# Patient Record
Sex: MALE | Race: WHITE | Employment: FULL TIME | URBAN - NONMETROPOLITAN AREA
[De-identification: names, ages, dates, MRNs, and addresses within clinical notes are randomized per-mention and may not be internally consistent; named-entity substitution may affect disease eponyms.]

---

## 2017-03-16 ENCOUNTER — TELEPHONE (OUTPATIENT)
Dept: CARDIOLOGY | Age: 27
End: 2017-03-16

## 2017-07-25 ENCOUNTER — HOSPITAL ENCOUNTER (EMERGENCY)
Age: 27
Discharge: HOME | End: 2017-07-25
Attending: EMERGENCY MEDICINE
Payer: COMMERCIAL

## 2017-07-25 VITALS
OXYGEN SATURATION: 100 % | HEART RATE: 88 BPM | SYSTOLIC BLOOD PRESSURE: 156 MMHG | DIASTOLIC BLOOD PRESSURE: 96 MMHG | TEMPERATURE: 98.4 F | RESPIRATION RATE: 18 BRPM | HEIGHT: 74 IN | BODY MASS INDEX: 25.67 KG/M2 | WEIGHT: 200 LBS

## 2017-07-25 DIAGNOSIS — T15.91XA FB EYE, RIGHT, INITIAL ENCOUNTER: Primary | ICD-10-CM

## 2017-07-25 PROCEDURE — 90715 TDAP VACCINE 7 YRS/> IM: CPT

## 2017-07-25 PROCEDURE — 90471 IMMUNIZATION ADMIN: CPT

## 2017-07-25 PROCEDURE — 65220 REMOVE FOREIGN BODY FROM EYE: CPT

## 2017-07-25 PROCEDURE — 99282 EMERGENCY DEPT VISIT SF MDM: CPT

## 2017-07-25 PROCEDURE — 6360000002 HC RX W HCPCS

## 2017-07-25 RX ORDER — PROPARACAINE HYDROCHLORIDE 5 MG/ML
SOLUTION/ DROPS OPHTHALMIC
Status: DISCONTINUED
Start: 2017-07-25 | End: 2017-07-25 | Stop reason: HOSPADM

## 2017-07-25 RX ORDER — SULFACETAMIDE SODIUM 100 MG/ML
2 SOLUTION/ DROPS OPHTHALMIC 4 TIMES DAILY
Qty: 1 BOTTLE | Refills: 0 | Status: SHIPPED | OUTPATIENT
Start: 2017-07-25 | End: 2017-07-30

## 2017-07-25 RX ADMIN — TETANUS TOXOID, REDUCED DIPHTHERIA TOXOID AND ACELLULAR PERTUSSIS VACCINE, ADSORBED 0.5 ML: 5; 2.5; 8; 8; 2.5 SUSPENSION INTRAMUSCULAR at 11:31

## 2017-07-25 ASSESSMENT — ENCOUNTER SYMPTOMS
PHOTOPHOBIA: 0
DIARRHEA: 0
SORE THROAT: 0
SHORTNESS OF BREATH: 0
EYE PAIN: 0
BLOOD IN STOOL: 0
NAUSEA: 0
COUGH: 0
WHEEZING: 0
ABDOMINAL PAIN: 0
BACK PAIN: 0
EYE ITCHING: 0
VOMITING: 0

## 2017-07-25 ASSESSMENT — VISUAL ACUITY
OS: 20/20
OU: 20/15
OD: 20/15

## 2018-09-14 ENCOUNTER — HOSPITAL ENCOUNTER (EMERGENCY)
Age: 28
Discharge: HOME | End: 2018-09-14
Attending: FAMILY MEDICINE
Payer: COMMERCIAL

## 2018-09-14 ENCOUNTER — APPOINTMENT (OUTPATIENT)
Dept: GENERAL RADIOLOGY | Age: 28
End: 2018-09-14
Payer: COMMERCIAL

## 2018-09-14 VITALS
TEMPERATURE: 98.8 F | RESPIRATION RATE: 18 BRPM | BODY MASS INDEX: 25.67 KG/M2 | OXYGEN SATURATION: 99 % | HEART RATE: 82 BPM | SYSTOLIC BLOOD PRESSURE: 140 MMHG | WEIGHT: 200 LBS | HEIGHT: 74 IN | DIASTOLIC BLOOD PRESSURE: 77 MMHG

## 2018-09-14 DIAGNOSIS — F41.1 ANXIETY STATE: Primary | ICD-10-CM

## 2018-09-14 DIAGNOSIS — R07.89 OTHER CHEST PAIN: ICD-10-CM

## 2018-09-14 LAB
ALBUMIN SERPL-MCNC: 4.8 G/DL (ref 3.5–5.1)
ALP BLD-CCNC: 67 U/L (ref 38–126)
ALT SERPL-CCNC: 22 U/L (ref 11–66)
AMPHETAMINE+METHAMPHETAMINE URINE SCREEN: NEGATIVE
ANION GAP SERPL CALCULATED.3IONS-SCNC: 15 MEQ/L (ref 8–16)
AST SERPL-CCNC: 21 U/L (ref 5–40)
BARBITURATE QUANTITATIVE URINE: NEGATIVE
BASOPHILS # BLD: 0.6 %
BASOPHILS ABSOLUTE: 0 THOU/MM3 (ref 0–0.1)
BENZODIAZEPINE QUANTITATIVE URINE: NEGATIVE
BILIRUB SERPL-MCNC: 0.4 MG/DL (ref 0.3–1.2)
BUN BLDV-MCNC: 13 MG/DL (ref 7–22)
C-REACTIVE PROTEIN: 0.25 MG/DL (ref 0–1)
CALCIUM SERPL-MCNC: 9.2 MG/DL (ref 8.5–10.5)
CANNABINOID QUANTITATIVE URINE: NEGATIVE
CHLORIDE BLD-SCNC: 99 MEQ/L (ref 98–111)
CO2: 26 MEQ/L (ref 23–33)
COCAINE METABOLITE QUANTITATIVE URINE: NEGATIVE
CREAT SERPL-MCNC: 1 MG/DL (ref 0.4–1.2)
EKG ATRIAL RATE: 80 BPM
EKG P AXIS: 63 DEGREES
EKG P-R INTERVAL: 138 MS
EKG Q-T INTERVAL: 384 MS
EKG QRS DURATION: 102 MS
EKG QTC CALCULATION (BAZETT): 442 MS
EKG R AXIS: 66 DEGREES
EKG T AXIS: 48 DEGREES
EKG VENTRICULAR RATE: 80 BPM
EOSINOPHIL # BLD: 0.5 %
EOSINOPHILS ABSOLUTE: 0 THOU/MM3 (ref 0–0.4)
ERYTHROCYTE [DISTWIDTH] IN BLOOD BY AUTOMATED COUNT: 12.5 % (ref 11.5–14.5)
ERYTHROCYTE [DISTWIDTH] IN BLOOD BY AUTOMATED COUNT: 40.3 FL (ref 35–45)
GFR SERPL CREATININE-BSD FRML MDRD: > 90 ML/MIN/1.73M2
GLUCOSE BLD-MCNC: 138 MG/DL (ref 70–108)
HCT VFR BLD CALC: 44.3 % (ref 42–52)
HEMOGLOBIN: 15.1 GM/DL (ref 14–18)
IMMATURE GRANS (ABS): 0.01 THOU/MM3 (ref 0–0.07)
IMMATURE GRANULOCYTES: 0.2 %
LYMPHOCYTES # BLD: 22.6 %
LYMPHOCYTES ABSOLUTE: 1.4 THOU/MM3 (ref 1–4.8)
MAGNESIUM: 2 MG/DL (ref 1.6–2.4)
MCH RBC QN AUTO: 30.1 PG (ref 26–33)
MCHC RBC AUTO-ENTMCNC: 34.1 GM/DL (ref 32.2–35.5)
MCV RBC AUTO: 88.2 FL (ref 80–94)
MONOCYTES # BLD: 7.5 %
MONOCYTES ABSOLUTE: 0.5 THOU/MM3 (ref 0.4–1.3)
NUCLEATED RED BLOOD CELLS: 0 /100 WBC
OPIATES, URINE: NEGATIVE
OSMOLALITY CALCULATION: 281.7 MOSMOL/KG (ref 275–300)
OXYCODONE: NEGATIVE
PHENCYCLIDINE QUANTITATIVE URINE: NEGATIVE
PLATELET # BLD: 295 THOU/MM3 (ref 130–400)
PMV BLD AUTO: 9.5 FL (ref 9.4–12.4)
POTASSIUM SERPL-SCNC: 3.8 MEQ/L (ref 3.5–5.2)
RBC # BLD: 5.02 MILL/MM3 (ref 4.7–6.1)
SEG NEUTROPHILS: 68.6 %
SEGMENTED NEUTROPHILS ABSOLUTE COUNT: 4.3 THOU/MM3 (ref 1.8–7.7)
SODIUM BLD-SCNC: 140 MEQ/L (ref 135–145)
TOTAL PROTEIN: 7.8 G/DL (ref 6.1–8)
TROPONIN T: < 0.01 NG/ML
WBC # BLD: 6.2 THOU/MM3 (ref 4.8–10.8)

## 2018-09-14 PROCEDURE — 84484 ASSAY OF TROPONIN QUANT: CPT

## 2018-09-14 PROCEDURE — 93010 ELECTROCARDIOGRAM REPORT: CPT | Performed by: INTERNAL MEDICINE

## 2018-09-14 PROCEDURE — 86140 C-REACTIVE PROTEIN: CPT

## 2018-09-14 PROCEDURE — 85025 COMPLETE CBC W/AUTO DIFF WBC: CPT

## 2018-09-14 PROCEDURE — 93005 ELECTROCARDIOGRAM TRACING: CPT | Performed by: FAMILY MEDICINE

## 2018-09-14 PROCEDURE — 83735 ASSAY OF MAGNESIUM: CPT

## 2018-09-14 PROCEDURE — 71046 X-RAY EXAM CHEST 2 VIEWS: CPT

## 2018-09-14 PROCEDURE — 80053 COMPREHEN METABOLIC PANEL: CPT

## 2018-09-14 PROCEDURE — 36415 COLL VENOUS BLD VENIPUNCTURE: CPT

## 2018-09-14 PROCEDURE — 99285 EMERGENCY DEPT VISIT HI MDM: CPT

## 2018-09-14 PROCEDURE — 80307 DRUG TEST PRSMV CHEM ANLYZR: CPT

## 2018-09-14 RX ORDER — HYDROXYZINE 50 MG/1
50 TABLET, FILM COATED ORAL 3 TIMES DAILY PRN
Qty: 21 TABLET | Refills: 0 | Status: SHIPPED | OUTPATIENT
Start: 2018-09-14 | End: 2018-10-10

## 2018-09-14 ASSESSMENT — ENCOUNTER SYMPTOMS
SORE THROAT: 0
EYE DISCHARGE: 0
DIARRHEA: 0
COUGH: 0
RHINORRHEA: 0
VOMITING: 0
NAUSEA: 0
SHORTNESS OF BREATH: 0
BACK PAIN: 0
EYE REDNESS: 0
WHEEZING: 0
ABDOMINAL PAIN: 0

## 2018-09-14 ASSESSMENT — HEART SCORE: ECG: 0

## 2018-09-14 NOTE — ED PROVIDER NOTES
Headache(784.0); and Migraine. SURGICAL HISTORY      has a past surgical history that includes Hand surgery (2007); shoulder surgery (2007); Tympanostomy tube placement (2010); and mastoidectomy (Right, 07/2013). CURRENT MEDICATIONS       Discharge Medication List as of 9/14/2018 12:32 PM          ALLERGIES     has No Known Allergies. FAMILY HISTORY     indicated that his mother is alive. He indicated that his father is alive. He indicated that both of his sisters are alive. He indicated that the status of his neg hx is unknown.    family history includes Thyroid Disease in his mother. SOCIAL HISTORY      reports that he quit smoking about 8 years ago. His smoking use included Cigarettes. He smoked 1.00 pack per day. His smokeless tobacco use includes Chew. He reports that he drinks alcohol. He reports that he does not use drugs. PHYSICAL EXAM     INITIAL VITALS:  height is 6' 2\" (1.88 m) and weight is 200 lb (90.7 kg). His oral temperature is 98.8 °F (37.1 °C). His blood pressure is 140/77 (abnormal) and his pulse is 82. His respiration is 18 and oxygen saturation is 99%. Physical Exam   Constitutional: He is oriented to person, place, and time. He appears well-developed and well-nourished. Non-toxic appearance. HENT:   Head: Normocephalic and atraumatic. Right Ear: Tympanic membrane and external ear normal.   Left Ear: Tympanic membrane and external ear normal.   Nose: Nose normal.   Mouth/Throat: Oropharynx is clear and moist and mucous membranes are normal. No oropharyngeal exudate, posterior oropharyngeal edema or posterior oropharyngeal erythema. Eyes: Conjunctivae and EOM are normal.   Neck: Normal range of motion. Neck supple. No JVD present. Cardiovascular: Normal rate, regular rhythm, normal heart sounds, intact distal pulses and normal pulses. Exam reveals no gallop and no friction rub. No murmur heard.   Pulses:       Radial pulses are 2+ on the right side, and 2+ on the left side. Dorsalis pedis pulses are 2+ on the right side, and 2+ on the left side. Posterior tibial pulses are 2+ on the right side, and 2+ on the left side. Pulmonary/Chest: Effort normal and breath sounds normal. No respiratory distress. He has no decreased breath sounds. He has no wheezes. He has no rhonchi. He has no rales. Abdominal: Soft. Bowel sounds are normal. He exhibits no distension. There is no tenderness. There is no rebound, no guarding and no CVA tenderness. Musculoskeletal: Normal range of motion. He exhibits no edema. Neurological: He is alert and oriented to person, place, and time. He exhibits normal muscle tone. Coordination normal.   Skin: Skin is warm and dry. No rash noted. He is not diaphoretic. Psychiatric: His mood appears anxious. Nursing note and vitals reviewed. DIFFERENTIAL DIAGNOSIS:   Including but not limited to: pericarditis, gerd, costochondritis, ACS, anxiety     DIAGNOSTIC RESULTS     EKG: All EKG's are interpreted by the Emergency Department Physician who either signs or Co-signs this chart in the absence of a cardiologist.  EKG interpreted by Amy Elizondo MD:  Vent. Rate: 80 bpm  LA interval: 138 ms  QRS duration: 102 ms  QTc: 442 ms  P-R-T axes: 63, 66, 48  Normal sinus rhythm   No STEMI  Compared to old EKG on 09-May-2016      RADIOLOGY: non-plain film images(s) such as CT, Ultrasound and MRI are read by the radiologist.    XR CHEST STANDARD (2 VW)   Final Result   Normal chest radiographs. **This report has been created using voice recognition software. It may contain minor errors which are inherent in voice recognition technology. **      Final report electronically signed by Dr. Juan Jose Valladares on 9/14/2018 11:49 AM          LABS:   Labs Reviewed   COMPREHENSIVE METABOLIC PANEL - Abnormal; Notable for the following:        Result Value    Glucose 138 (*)     All other components within normal limits   CBC WITH AUTO

## 2018-09-14 NOTE — CARE COORDINATION
Brief ED Social Work Assessment  9/14/18, 12:07 PM    Patient stated that he has insurance and requested and SRPS list.  Patient denied any needs.

## 2018-10-08 ENCOUNTER — TELEPHONE (OUTPATIENT)
Dept: CARDIOLOGY CLINIC | Age: 28
End: 2018-10-08

## 2018-10-08 ENCOUNTER — TELEPHONE (OUTPATIENT)
Dept: FAMILY MEDICINE CLINIC | Age: 28
End: 2018-10-08

## 2018-10-10 ENCOUNTER — OFFICE VISIT (OUTPATIENT)
Dept: FAMILY MEDICINE CLINIC | Age: 28
End: 2018-10-10
Payer: COMMERCIAL

## 2018-10-10 VITALS
HEART RATE: 84 BPM | HEIGHT: 72 IN | RESPIRATION RATE: 16 BRPM | WEIGHT: 215.6 LBS | DIASTOLIC BLOOD PRESSURE: 76 MMHG | SYSTOLIC BLOOD PRESSURE: 132 MMHG | BODY MASS INDEX: 29.2 KG/M2

## 2018-10-10 DIAGNOSIS — F41.9 ANXIETY: ICD-10-CM

## 2018-10-10 DIAGNOSIS — R00.2 HEART PALPITATIONS: Primary | ICD-10-CM

## 2018-10-10 PROCEDURE — G8484 FLU IMMUNIZE NO ADMIN: HCPCS | Performed by: FAMILY MEDICINE

## 2018-10-10 PROCEDURE — 1036F TOBACCO NON-USER: CPT | Performed by: FAMILY MEDICINE

## 2018-10-10 PROCEDURE — G8419 CALC BMI OUT NRM PARAM NOF/U: HCPCS | Performed by: FAMILY MEDICINE

## 2018-10-10 PROCEDURE — 99213 OFFICE O/P EST LOW 20 MIN: CPT | Performed by: FAMILY MEDICINE

## 2018-10-10 PROCEDURE — G8427 DOCREV CUR MEDS BY ELIG CLIN: HCPCS | Performed by: FAMILY MEDICINE

## 2018-10-10 RX ORDER — ESCITALOPRAM OXALATE 10 MG/1
10 TABLET ORAL DAILY
Qty: 30 TABLET | Refills: 1 | Status: SHIPPED | OUTPATIENT
Start: 2018-10-10 | End: 2018-11-07

## 2018-10-10 RX ORDER — HYDROXYZINE HYDROCHLORIDE 25 MG/1
25 TABLET, FILM COATED ORAL EVERY 8 HOURS PRN
Qty: 30 TABLET | Refills: 1 | Status: SHIPPED | OUTPATIENT
Start: 2018-10-10 | End: 2018-10-20

## 2018-10-10 ASSESSMENT — PATIENT HEALTH QUESTIONNAIRE - PHQ9
SUM OF ALL RESPONSES TO PHQ QUESTIONS 1-9: 0
SUM OF ALL RESPONSES TO PHQ QUESTIONS 1-9: 0
2. FEELING DOWN, DEPRESSED OR HOPELESS: 0
SUM OF ALL RESPONSES TO PHQ9 QUESTIONS 1 & 2: 0
1. LITTLE INTEREST OR PLEASURE IN DOING THINGS: 0

## 2018-10-10 ASSESSMENT — ENCOUNTER SYMPTOMS
GASTROINTESTINAL NEGATIVE: 1
RESPIRATORY NEGATIVE: 1

## 2018-10-10 NOTE — PROGRESS NOTES
and atraumatic. Right Ear: Tympanic membrane normal.   Left Ear: Tympanic membrane normal.   Mouth/Throat: Oropharynx is clear and moist and mucous membranes are normal.   Cardiovascular: Normal rate, regular rhythm and normal heart sounds. No murmur heard. Pulmonary/Chest: Effort normal and breath sounds normal.   Abdominal: Soft. Bowel sounds are normal.   Musculoskeletal: He exhibits no edema. Neurological: He is alert and oriented to person, place, and time. Skin: Skin is warm and dry. Psychiatric: He has a normal mood and affect. His behavior is normal.   Nursing note and vitals reviewed. Assessment:       Diagnosis Orders   1. Heart palpitations     2.  Anxiety  escitalopram (LEXAPRO) 10 MG tablet    hydrOXYzine (ATARAX) 25 MG tablet           Plan:      -  PMHx reviewed  -  Extensive cardiac work up in the past all reassuring, has follow up scheduled with Cardio later this month  -  Start Lexapro  -  rx Atarax prn  -  RTO 1 month        Merribeelizabeth Covert, DO

## 2018-10-25 ENCOUNTER — OFFICE VISIT (OUTPATIENT)
Dept: CARDIOLOGY CLINIC | Age: 28
End: 2018-10-25
Payer: COMMERCIAL

## 2018-10-25 VITALS
DIASTOLIC BLOOD PRESSURE: 98 MMHG | WEIGHT: 214.8 LBS | HEIGHT: 73 IN | SYSTOLIC BLOOD PRESSURE: 140 MMHG | HEART RATE: 87 BPM | BODY MASS INDEX: 28.47 KG/M2

## 2018-10-25 DIAGNOSIS — F41.9 ANXIETY: ICD-10-CM

## 2018-10-25 DIAGNOSIS — I10 HYPERTENSION, UNSPECIFIED TYPE: ICD-10-CM

## 2018-10-25 DIAGNOSIS — Z86.79 HISTORY OF PERICARDITIS: ICD-10-CM

## 2018-10-25 DIAGNOSIS — R00.2 PALPITATIONS: Primary | ICD-10-CM

## 2018-10-25 PROCEDURE — G8484 FLU IMMUNIZE NO ADMIN: HCPCS | Performed by: NURSE PRACTITIONER

## 2018-10-25 PROCEDURE — 1036F TOBACCO NON-USER: CPT | Performed by: NURSE PRACTITIONER

## 2018-10-25 PROCEDURE — G8419 CALC BMI OUT NRM PARAM NOF/U: HCPCS | Performed by: NURSE PRACTITIONER

## 2018-10-25 PROCEDURE — G8427 DOCREV CUR MEDS BY ELIG CLIN: HCPCS | Performed by: NURSE PRACTITIONER

## 2018-10-25 PROCEDURE — 99213 OFFICE O/P EST LOW 20 MIN: CPT | Performed by: NURSE PRACTITIONER

## 2018-10-25 RX ORDER — DILTIAZEM HYDROCHLORIDE 120 MG/1
120 CAPSULE, COATED, EXTENDED RELEASE ORAL DAILY
Qty: 30 CAPSULE | Refills: 3 | Status: SHIPPED | OUTPATIENT
Start: 2018-10-25 | End: 2018-11-07

## 2018-10-25 NOTE — PROGRESS NOTES
General Brady M.D.    Echo: 6/3/14  SUMMARY:    Left ventricle:  Size was normal.  Systolic function was normal. Ejection fraction was estimated in the range of  55 % to 65 %. There were no regional wall motion abnormalities. Summary: compared to prior study the ejection fraction is now better    Prepared and signed by    Efrem Denson DO    Stress test: 1/14/14  CONCLUSION:    1. Excellent treadmill exercise. 2.    Completed 12 metabolic equivalent functional Class 1.    3.    EKG was negative for ischemia. 4.    I do not see any contraindication to activities at this time but he          should not lift more than 50 lb. Efrem Denson D.O. Diagnosis Orders   1. Palpitations  ECHO Complete 2D W Doppler W Color   2. History of pericarditis     3. Hypertension, unspecified type     4. Anxiety         Orders Placed This Encounter   Procedures    ECHO Complete 2D W Doppler W Color     Standing Status:   Future     Standing Expiration Date:   10/25/2019     Order Specific Question:   Reason for exam:     Answer:   palpitations       Will repeat echo, last echo in 2014  Has tried metoprolol in past without any reduction in palpitations. Will start cardizem  mg daily for palpitations and to help lower BP. He will monitor his BP and HR at home and bring logged results to follow up appt. He is to call the office if his BP or HR are outside of the parameters discussed today. Advised to stop caffeine. Is on lexapro and is decreasing anxiety       Discussed use, benefit, and side effects of prescribed medications. All patient questions answered. Pt voiced understanding. Instructed to continue current medications, diet and exercise. Continue risk factor modification and medical management. Patient agreed with treatment plan. Follow up as directed.     Follow up as scheduled or sooner if needed

## 2018-11-07 ENCOUNTER — OFFICE VISIT (OUTPATIENT)
Dept: FAMILY MEDICINE CLINIC | Age: 28
End: 2018-11-07
Payer: COMMERCIAL

## 2018-11-07 ENCOUNTER — HOSPITAL ENCOUNTER (OUTPATIENT)
Dept: NON INVASIVE DIAGNOSTICS | Age: 28
Discharge: HOME | End: 2018-11-07
Payer: COMMERCIAL

## 2018-11-07 VITALS
BODY MASS INDEX: 28.36 KG/M2 | DIASTOLIC BLOOD PRESSURE: 72 MMHG | RESPIRATION RATE: 16 BRPM | SYSTOLIC BLOOD PRESSURE: 122 MMHG | HEIGHT: 73 IN | HEART RATE: 76 BPM | WEIGHT: 214 LBS

## 2018-11-07 DIAGNOSIS — F41.9 ANXIETY: ICD-10-CM

## 2018-11-07 DIAGNOSIS — R00.2 PALPITATIONS: ICD-10-CM

## 2018-11-07 DIAGNOSIS — R00.2 HEART PALPITATIONS: Primary | ICD-10-CM

## 2018-11-07 LAB
LV EF: 60 %
LVEF MODALITY: NORMAL

## 2018-11-07 PROCEDURE — 99213 OFFICE O/P EST LOW 20 MIN: CPT | Performed by: FAMILY MEDICINE

## 2018-11-07 PROCEDURE — 93306 TTE W/DOPPLER COMPLETE: CPT

## 2018-11-07 PROCEDURE — G8427 DOCREV CUR MEDS BY ELIG CLIN: HCPCS | Performed by: FAMILY MEDICINE

## 2018-11-07 PROCEDURE — G8419 CALC BMI OUT NRM PARAM NOF/U: HCPCS | Performed by: FAMILY MEDICINE

## 2018-11-07 PROCEDURE — 1036F TOBACCO NON-USER: CPT | Performed by: FAMILY MEDICINE

## 2018-11-07 PROCEDURE — G8484 FLU IMMUNIZE NO ADMIN: HCPCS | Performed by: FAMILY MEDICINE

## 2018-11-07 RX ORDER — HYDROXYZINE HYDROCHLORIDE 25 MG/1
25 TABLET, FILM COATED ORAL PRN
COMMUNITY
End: 2020-01-17 | Stop reason: SDUPTHER

## 2018-11-07 RX ORDER — ESCITALOPRAM OXALATE 20 MG/1
20 TABLET ORAL DAILY
Qty: 90 TABLET | Refills: 3 | Status: SHIPPED | OUTPATIENT
Start: 2018-11-07 | End: 2019-09-17 | Stop reason: SDUPTHER

## 2018-11-07 ASSESSMENT — ENCOUNTER SYMPTOMS
RESPIRATORY NEGATIVE: 1
GASTROINTESTINAL NEGATIVE: 1

## 2018-11-07 NOTE — PROGRESS NOTES
and well-nourished. HENT:   Head: Normocephalic and atraumatic. Right Ear: Tympanic membrane normal.   Left Ear: Tympanic membrane normal.   Mouth/Throat: Oropharynx is clear and moist and mucous membranes are normal.   Cardiovascular: Normal rate, regular rhythm and normal heart sounds. No murmur heard. Pulmonary/Chest: Effort normal and breath sounds normal.   Abdominal: Soft. Bowel sounds are normal.   Musculoskeletal: He exhibits no edema. Neurological: He is alert and oriented to person, place, and time. Skin: Skin is warm and dry. Psychiatric: He has a normal mood and affect. His behavior is normal.   Nursing note and vitals reviewed. Assessment:       Diagnosis Orders   1. Heart palpitations     2.  Anxiety  escitalopram (LEXAPRO) 20 MG tablet           Plan:      -  Overall doing a little better on the Lexapro, will increase the Lexapro  -  Continue Atarax prn  -  Follow up with Cardio, scheduled for echo  -  RTO as needed for now        Renata Llamas, DO

## 2018-12-06 ENCOUNTER — OFFICE VISIT (OUTPATIENT)
Dept: CARDIOLOGY CLINIC | Age: 28
End: 2018-12-06
Payer: COMMERCIAL

## 2018-12-06 VITALS
HEIGHT: 73 IN | BODY MASS INDEX: 29.72 KG/M2 | DIASTOLIC BLOOD PRESSURE: 88 MMHG | HEART RATE: 68 BPM | WEIGHT: 224.2 LBS | SYSTOLIC BLOOD PRESSURE: 132 MMHG

## 2018-12-06 DIAGNOSIS — I10 HYPERTENSION, UNSPECIFIED TYPE: ICD-10-CM

## 2018-12-06 DIAGNOSIS — Z86.79 HISTORY OF PERICARDITIS: ICD-10-CM

## 2018-12-06 DIAGNOSIS — F41.9 ANXIETY: ICD-10-CM

## 2018-12-06 DIAGNOSIS — R00.2 PALPITATIONS: Primary | ICD-10-CM

## 2018-12-06 PROCEDURE — G8427 DOCREV CUR MEDS BY ELIG CLIN: HCPCS | Performed by: NURSE PRACTITIONER

## 2018-12-06 PROCEDURE — G8419 CALC BMI OUT NRM PARAM NOF/U: HCPCS | Performed by: NURSE PRACTITIONER

## 2018-12-06 PROCEDURE — 99213 OFFICE O/P EST LOW 20 MIN: CPT | Performed by: NURSE PRACTITIONER

## 2018-12-06 PROCEDURE — G8484 FLU IMMUNIZE NO ADMIN: HCPCS | Performed by: NURSE PRACTITIONER

## 2018-12-06 PROCEDURE — 1036F TOBACCO NON-USER: CPT | Performed by: NURSE PRACTITIONER

## 2019-06-26 ENCOUNTER — TELEPHONE (OUTPATIENT)
Dept: CARDIOLOGY CLINIC | Age: 29
End: 2019-06-26

## 2019-09-17 DIAGNOSIS — F41.9 ANXIETY: ICD-10-CM

## 2019-09-17 RX ORDER — ESCITALOPRAM OXALATE 20 MG/1
TABLET ORAL
Qty: 90 TABLET | Refills: 0 | Status: SHIPPED | OUTPATIENT
Start: 2019-09-17 | End: 2020-01-17 | Stop reason: SDUPTHER

## 2020-01-17 ENCOUNTER — OFFICE VISIT (OUTPATIENT)
Dept: FAMILY MEDICINE CLINIC | Age: 30
End: 2020-01-17
Payer: COMMERCIAL

## 2020-01-17 VITALS
HEIGHT: 73 IN | WEIGHT: 252.5 LBS | BODY MASS INDEX: 33.46 KG/M2 | DIASTOLIC BLOOD PRESSURE: 66 MMHG | SYSTOLIC BLOOD PRESSURE: 112 MMHG | RESPIRATION RATE: 18 BRPM | HEART RATE: 72 BPM

## 2020-01-17 PROCEDURE — G8484 FLU IMMUNIZE NO ADMIN: HCPCS | Performed by: FAMILY MEDICINE

## 2020-01-17 PROCEDURE — 99395 PREV VISIT EST AGE 18-39: CPT | Performed by: FAMILY MEDICINE

## 2020-01-17 RX ORDER — ESCITALOPRAM OXALATE 20 MG/1
TABLET ORAL
Qty: 30 TABLET | Refills: 11 | Status: SHIPPED | OUTPATIENT
Start: 2020-01-17 | End: 2021-02-16 | Stop reason: SDUPTHER

## 2020-01-17 RX ORDER — HYDROXYZINE HYDROCHLORIDE 25 MG/1
25 TABLET, FILM COATED ORAL EVERY 8 HOURS PRN
Qty: 40 TABLET | Refills: 2 | Status: SHIPPED | OUTPATIENT
Start: 2020-01-17

## 2020-01-17 SDOH — ECONOMIC STABILITY: INCOME INSECURITY: HOW HARD IS IT FOR YOU TO PAY FOR THE VERY BASICS LIKE FOOD, HOUSING, MEDICAL CARE, AND HEATING?: NOT HARD AT ALL

## 2020-01-17 SDOH — ECONOMIC STABILITY: FOOD INSECURITY: WITHIN THE PAST 12 MONTHS, YOU WORRIED THAT YOUR FOOD WOULD RUN OUT BEFORE YOU GOT MONEY TO BUY MORE.: NEVER TRUE

## 2020-01-17 SDOH — ECONOMIC STABILITY: TRANSPORTATION INSECURITY
IN THE PAST 12 MONTHS, HAS THE LACK OF TRANSPORTATION KEPT YOU FROM MEDICAL APPOINTMENTS OR FROM GETTING MEDICATIONS?: NO

## 2020-01-17 SDOH — ECONOMIC STABILITY: FOOD INSECURITY: WITHIN THE PAST 12 MONTHS, THE FOOD YOU BOUGHT JUST DIDN'T LAST AND YOU DIDN'T HAVE MONEY TO GET MORE.: NEVER TRUE

## 2020-01-17 SDOH — ECONOMIC STABILITY: TRANSPORTATION INSECURITY
IN THE PAST 12 MONTHS, HAS LACK OF TRANSPORTATION KEPT YOU FROM MEETINGS, WORK, OR FROM GETTING THINGS NEEDED FOR DAILY LIVING?: NO

## 2020-01-17 ASSESSMENT — ENCOUNTER SYMPTOMS
GASTROINTESTINAL NEGATIVE: 1
RESPIRATORY NEGATIVE: 1

## 2020-01-17 ASSESSMENT — PATIENT HEALTH QUESTIONNAIRE - PHQ9
SUM OF ALL RESPONSES TO PHQ QUESTIONS 1-9: 0
SUM OF ALL RESPONSES TO PHQ9 QUESTIONS 1 & 2: 0
1. LITTLE INTEREST OR PLEASURE IN DOING THINGS: 0
SUM OF ALL RESPONSES TO PHQ QUESTIONS 1-9: 0
2. FEELING DOWN, DEPRESSED OR HOPELESS: 0

## 2021-02-16 ENCOUNTER — VIRTUAL VISIT (OUTPATIENT)
Dept: FAMILY MEDICINE CLINIC | Age: 31
End: 2021-02-16
Payer: COMMERCIAL

## 2021-02-16 DIAGNOSIS — F41.9 ANXIETY: ICD-10-CM

## 2021-02-16 DIAGNOSIS — Z00.00 WELL ADULT HEALTH CHECK: Primary | ICD-10-CM

## 2021-02-16 DIAGNOSIS — N52.9 ERECTILE DYSFUNCTION, UNSPECIFIED ERECTILE DYSFUNCTION TYPE: ICD-10-CM

## 2021-02-16 PROCEDURE — G8484 FLU IMMUNIZE NO ADMIN: HCPCS | Performed by: FAMILY MEDICINE

## 2021-02-16 PROCEDURE — 99395 PREV VISIT EST AGE 18-39: CPT | Performed by: FAMILY MEDICINE

## 2021-02-16 RX ORDER — ESCITALOPRAM OXALATE 20 MG/1
TABLET ORAL
Qty: 90 TABLET | Refills: 3 | Status: SHIPPED | OUTPATIENT
Start: 2021-02-16 | End: 2022-03-17

## 2021-02-16 ASSESSMENT — ENCOUNTER SYMPTOMS
GASTROINTESTINAL NEGATIVE: 1
RESPIRATORY NEGATIVE: 1

## 2021-02-16 NOTE — PROGRESS NOTES
iSsi Leong (:  1990) is a 31 y.o. male,Established patient, here for evaluation of the following chief complaint(s): Annual Exam        SUBJECTIVE/OBJECTIVE:  HPI:    Chief Complaint   Patient presents with    Annual Exam     Annual wellness. Doing well overall. He is still on the Lexapro, working well. Rarely needs the Atarax. He does admit to some ED issues at times, questions whether it's related to the Lexapro. Patient Active Problem List   Diagnosis    EMIL (serous otitis media)    Dysfunction of eustachian tube    Cholesteatoma, middle ear    Conductive hearing loss in right ear    Dizzy    Acute myopericarditis     Past Surgical History:   Procedure Laterality Date    HAND SURGERY  2007    MASTOIDECTOMY Right 2013    SHOULDER SURGERY      TYMPANOSTOMY TUBE PLACEMENT      dr. Edy Lynn    VASECTOMY           Review of Systems   Constitutional: Negative. HENT: Negative. Respiratory: Negative. Cardiovascular: Negative. Gastrointestinal: Negative. Genitourinary:        ED   Musculoskeletal: Negative. All other systems reviewed and are negative. No flowsheet data found. Physical Exam  Constitutional:       General: He is not in acute distress. Appearance: Normal appearance. He is well-developed. He is not ill-appearing. HENT:      Head: Normocephalic and atraumatic. Right Ear: External ear normal.      Left Ear: External ear normal.   Eyes:      Conjunctiva/sclera: Conjunctivae normal.   Pulmonary:      Effort: Pulmonary effort is normal. No respiratory distress. Skin:     Findings: No rash (on exposed surfaces). Neurological:      Mental Status: He is alert and oriented to person, place, and time. Psychiatric:         Mood and Affect: Mood normal.         Behavior: Behavior normal.         Thought Content: Thought content normal.         Judgment: Judgment normal.       ASSESSMENT/PLAN:  1. Well adult health check  2.  Anxiety -     escitalopram (LEXAPRO) 20 MG tablet; TAKE 1 TABLET BY MOUTH EVERY DAY, Disp-90 tablet, R-3Normal  3. Erectile dysfunction, unspecified erectile dysfunction type    -  Healthy lifestyle discussed  -  Doing well on the Lexapro, refill sent  -  Options for #3 discussed, declines at this time  -  May be related to the Lexapro but he wishes to continue at current dose    Return in about 1 year (around 2/16/2022) for Wellness. Mahesh Ivy is a 31 y.o. male being evaluated by a Virtual Visit (video visit) encounter to address concerns as mentioned above. A caregiver was present when appropriate. Due to this being a TeleHealth encounter (During ZNYWM-36 public health emergency), evaluation of the following organ systems was limited: Vitals/Constitutional/EENT/Resp/CV/GI//MS/Neuro/Skin/Heme-Lymph-Imm. Pursuant to the emergency declaration under the [address] [place] [address] authority and the [employer] Act, this Virtual Visit was conducted with patient's (and/or legal guardian's) consent, to reduce the patient's risk of exposure to COVID-19 and provide necessary medical care. The patient (and/or legal guardian) has also been advised to contact this office for worsening conditions or problems, and seek emergency medical treatment and/or call 911 if deemed necessary. Patient identification was verified at the start of the visit: Yes    Services were provided through a video synchronous discussion virtually to substitute for in-person clinic visit. Patient was located at home and provider was located in office or at home. An electronic signature was used to authenticate this note.     --Darren Maguire, DO

## 2021-09-14 ENCOUNTER — HOSPITAL ENCOUNTER (EMERGENCY)
Age: 31
Discharge: HOME | End: 2021-09-14
Attending: EMERGENCY MEDICINE
Payer: MEDICARE

## 2021-09-14 ENCOUNTER — APPOINTMENT (OUTPATIENT)
Dept: GENERAL RADIOLOGY | Age: 31
End: 2021-09-14
Payer: MEDICARE

## 2021-09-14 VITALS
TEMPERATURE: 98.7 F | RESPIRATION RATE: 18 BRPM | HEIGHT: 73 IN | DIASTOLIC BLOOD PRESSURE: 98 MMHG | SYSTOLIC BLOOD PRESSURE: 143 MMHG | OXYGEN SATURATION: 97 % | WEIGHT: 250 LBS | BODY MASS INDEX: 33.13 KG/M2 | HEART RATE: 87 BPM

## 2021-09-14 DIAGNOSIS — U07.1 COVID-19: Primary | ICD-10-CM

## 2021-09-14 LAB
ANION GAP SERPL CALCULATED.3IONS-SCNC: 12 MEQ/L (ref 8–16)
BASOPHILS # BLD: 0.2 %
BASOPHILS ABSOLUTE: 0 THOU/MM3 (ref 0–0.1)
BUN BLDV-MCNC: 14 MG/DL (ref 7–22)
CALCIUM SERPL-MCNC: 9 MG/DL (ref 8.5–10.5)
CHLORIDE BLD-SCNC: 100 MEQ/L (ref 98–111)
CO2: 27 MEQ/L (ref 23–33)
CREAT SERPL-MCNC: 0.9 MG/DL (ref 0.4–1.2)
EKG ATRIAL RATE: 90 BPM
EKG P AXIS: 55 DEGREES
EKG P-R INTERVAL: 150 MS
EKG Q-T INTERVAL: 354 MS
EKG QRS DURATION: 94 MS
EKG QTC CALCULATION (BAZETT): 433 MS
EKG R AXIS: 17 DEGREES
EKG T AXIS: 59 DEGREES
EKG VENTRICULAR RATE: 90 BPM
EOSINOPHIL # BLD: 0 %
EOSINOPHILS ABSOLUTE: 0 THOU/MM3 (ref 0–0.4)
ERYTHROCYTE [DISTWIDTH] IN BLOOD BY AUTOMATED COUNT: 12.6 % (ref 11.5–14.5)
ERYTHROCYTE [DISTWIDTH] IN BLOOD BY AUTOMATED COUNT: 41.1 FL (ref 35–45)
GFR SERPL CREATININE-BSD FRML MDRD: > 90 ML/MIN/1.73M2
GLUCOSE BLD-MCNC: 101 MG/DL (ref 70–108)
HCT VFR BLD CALC: 47.6 % (ref 42–52)
HEMOGLOBIN: 16.3 GM/DL (ref 14–18)
IMMATURE GRANS (ABS): 0.05 THOU/MM3 (ref 0–0.07)
IMMATURE GRANULOCYTES: 0.6 %
LYMPHOCYTES # BLD: 17.8 %
LYMPHOCYTES ABSOLUTE: 1.5 THOU/MM3 (ref 1–4.8)
MCH RBC QN AUTO: 30.6 PG (ref 26–33)
MCHC RBC AUTO-ENTMCNC: 34.2 GM/DL (ref 32.2–35.5)
MCV RBC AUTO: 89.5 FL (ref 80–94)
MONOCYTES # BLD: 13.7 %
MONOCYTES ABSOLUTE: 1.2 THOU/MM3 (ref 0.4–1.3)
NUCLEATED RED BLOOD CELLS: 0 /100 WBC
PLATELET # BLD: 241 THOU/MM3 (ref 130–400)
PMV BLD AUTO: 9.2 FL (ref 9.4–12.4)
POTASSIUM SERPL-SCNC: 3.9 MEQ/L (ref 3.5–5.2)
RBC # BLD: 5.32 MILL/MM3 (ref 4.7–6.1)
SEG NEUTROPHILS: 67.7 %
SEGMENTED NEUTROPHILS ABSOLUTE COUNT: 5.8 THOU/MM3 (ref 1.8–7.7)
SODIUM BLD-SCNC: 139 MEQ/L (ref 135–145)
TROPONIN T: < 0.01 NG/ML
WBC # BLD: 8.6 THOU/MM3 (ref 4.8–10.8)

## 2021-09-14 PROCEDURE — 71045 X-RAY EXAM CHEST 1 VIEW: CPT

## 2021-09-14 PROCEDURE — 85025 COMPLETE CBC W/AUTO DIFF WBC: CPT

## 2021-09-14 PROCEDURE — 93005 ELECTROCARDIOGRAM TRACING: CPT | Performed by: EMERGENCY MEDICINE

## 2021-09-14 PROCEDURE — 36415 COLL VENOUS BLD VENIPUNCTURE: CPT

## 2021-09-14 PROCEDURE — 99282 EMERGENCY DEPT VISIT SF MDM: CPT

## 2021-09-14 PROCEDURE — 80048 BASIC METABOLIC PNL TOTAL CA: CPT

## 2021-09-14 PROCEDURE — 84484 ASSAY OF TROPONIN QUANT: CPT

## 2021-09-14 ASSESSMENT — ENCOUNTER SYMPTOMS
PHOTOPHOBIA: 0
SHORTNESS OF BREATH: 1
ABDOMINAL PAIN: 0
COUGH: 0
CONSTIPATION: 0
ABDOMINAL DISTENTION: 0
DIARRHEA: 0
CHEST TIGHTNESS: 0
VOMITING: 0
SINUS PAIN: 0
NAUSEA: 0
SINUS PRESSURE: 0

## 2021-09-14 ASSESSMENT — PAIN DESCRIPTION - LOCATION: LOCATION: CHEST

## 2021-09-14 ASSESSMENT — PAIN SCALES - GENERAL: PAINLEVEL_OUTOF10: 2

## 2021-09-14 ASSESSMENT — PAIN DESCRIPTION - PAIN TYPE: TYPE: ACUTE PAIN

## 2021-09-14 NOTE — ED PROVIDER NOTES
[address] [facility] [address] [address] EMERGENCY DEPT  Faculty Attestation    I performed a history and physical examination of the patient and discussed management with the resident. I reviewed the residents note and agree with the documented findings and plan of care. Any areas of disagreement are noted on the chart. I was personally present for the key portions of any procedures. I have documented in the chart those procedures where I was not present during the key portions. I have reviewed the emergency nurses triage note. I agree with the chief complaint, past medical history, past surgical history, allergies, medications, social, and family history as documented unless otherwise noted below. This is a 62-year-old male who presents to the emergency department for repeat evaluation  Patient was seen in urgent care yesterday and was diagnosed with COVID pneumonia  Patient tested positive for COVID 2 weeks ago  For the last several days he has had some shortness of breath and intermittent low retrosternal chest pressure  He does not have active chest pain today  Patient was placed on azithromycin and prednisone yesterday  As he is not better today he presents to the ER to make sure \"nothing else\" is going on  No fever today  No wheezing  No acute GI/ symptoms  No lower extremity edema or calf pain  No known history of PE/DVT  Review of systems otherwise negative  He has no additional concerns at this time    On examination he appears in no acute distress  Heart is regular in rate and rhythm  Lungs are clear to auscultation  Abdomen soft and nontender  Chest wall is nontender  No lower extremity edema  No calf pain  Negative Jillian sign  Normal peripheral perfusion and skin turgor  Normal vital signs    EKG 1131 shows a sinus rhythm with a rate of 90 bpm.  Intervals within normal limits. Normal axis. Poor R wave progression. No T wave inversion.   No ST segment depression/elevation or evidence for acute ischemia/infarction.     Patient is PERC negative  No suspicion for pulmonary embolism  D-dimer or CT not indicated    Labs reviewed  Normal WBC count  Trop negative    X-ray showing lingular infiltrate    Patient updated  Admission not indicated  Stable for outpatient follow up      Jasen Hauser DO  Attending Emergency Physician        Latisha Vivar DO  09/14/21 [id number]

## 2021-09-14 NOTE — ED TRIAGE NOTES
Pt presents to the ED for chest pain and dizziness that started today. Pt states that yesterday he was diagnosed with COVID pneumonia and started on steroids and an antibiotic. Pt states he has been sick since 9/3 and yesterday was his last day of quarantine.  Pt states he felt he was having a fever earlier today so took 500mg tylenol around 1030

## 2021-09-14 NOTE — ED PROVIDER NOTES
[facility] ENCOUNTER          Pt Name: Gena Mcintosh  MRN: [id number]  [place] 1990  Date of evaluation: 9/14/2021  Treating Resident Physician: Margarette Victoria MD  Supervising Physician: Dr. Tahira Thakkar       Chief Complaint   Patient presents with    Chest Pain    Post-COVID Symptoms     History obtained from the patient. HISTORY OF PRESENT ILLNESS    HPI  Gena Mcintosh is a 31 y.o. male who presents to the emergency department for evaluation of shortness of breath and lightheadedness    Patient states that he was diagnosed with Covid 2 weeks ago. At the end of his quarantine he was still feeling short of breath so he came into the emergency room to be evaluated yesterday. Patient yesterday he was having some substernal chest pain that has since resolved as well as severe shortness of breath. Patient said that he was diagnosed with Covid pneumonia and discharged on a steroid and antibiotic. Today the patient says that he is not having any chest pain however he still remains short of breath feels like he cannot catch his breath. Patient has he does have a productive cough however he does not spit it out he just swallows it so is unsure what it looks like. Patient says he is also having some lightheadedness when he is up and moving. Patient says he has been taking his medications as directed. Patient denies any tobacco alcohol or recreational drug use    The patient has no other acute complaints at this time. REVIEW OF SYSTEMS   Review of Systems   Constitutional: Negative for activity change, chills, fatigue and fever. HENT: Negative for sinus pressure and sinus pain. Eyes: Negative for photophobia and visual disturbance. Respiratory: Positive for shortness of breath. Negative for cough and chest tightness. Cardiovascular: Negative for chest pain and leg swelling.    Gastrointestinal: Negative for abdominal distention, abdominal pain, constipation, diarrhea, nausea and vomiting. Genitourinary: Negative for dysuria and hematuria. Neurological: Positive for light-headedness. Negative for dizziness, numbness and headaches. PAST MEDICAL AND SURGICAL HISTORY     Past Medical History:   Diagnosis Date    Acute myopericarditis     Anxiety     Cholesteatoma     Headache(784.0)     Migraine      Past Surgical History:   Procedure Laterality Date    HAND SURGERY  2007    MASTOIDECTOMY Right 2013    SHOULDER SURGERY      TYMPANOSTOMY TUBE PLACEMENT      dr. Adalid Lo    VASECTOMY           MEDICATIONS   No current facility-administered medications for this encounter. Current Outpatient Medications:     escitalopram (LEXAPRO) 20 MG tablet, TAKE 1 TABLET BY MOUTH EVERY DAY, Disp: 90 tablet, Rfl: 3    hydrOXYzine (ATARAX) 25 MG tablet, Take 1 tablet by mouth every 8 hours as needed for Anxiety, Disp: 40 tablet, Rfl: 2      SOCIAL HISTORY     Social History     Social History Narrative    ** Merged History Encounter **          Social History     Tobacco Use    Smoking status: Former Smoker     Packs/day: 1.00     Years: 0.50     Pack years: 0.50     Types: Cigarettes     Quit date: 2010     Years since quittin.0    Smokeless tobacco: Former User     Types: Chew     Quit date: 2018   Vaping Use    Vaping Use: Never used   Substance Use Topics    Alcohol use: Yes     Comment: occas.  Drug use: No         ALLERGIES   No Known Allergies      FAMILY HISTORY     Family History   Problem Relation Age of Onset    Thyroid Disease Mother     Cancer Neg Hx     Stroke Neg Hx     Birth Defects Neg Hx     Diabetes Neg Hx          PREVIOUS RECORDS   Previous records reviewed: Previously seen in the emergency department on 2018 for anxiety. Patient was worked up and ultimately deemed suitable for discharge home with follow-up. .        PHYSICAL EXAM     ED Triage Vitals   BP Temp Temp src Pulse Resp SpO2 Height Weight   -- -- -- -- -- -- -- --     Initial vital signs and nursing assessment reviewed and normal. Body mass index is 32.98 kg/m². Pulsoximetry is normal per my interpretation. Additional Vital Signs:  Vitals:    09/14/21 1135   BP: (!) 143/98   Pulse: 87   Resp: 18   Temp: 98.7 °F (37.1 °C)   SpO2: 97%       Physical Exam  Constitutional:       Appearance: He is normal weight. He is not ill-appearing. HENT:      Head: Normocephalic and atraumatic. Mouth/Throat:      Mouth: Mucous membranes are moist.      Pharynx: Oropharynx is clear. Eyes:      General: No scleral icterus. Right eye: No discharge. Left eye: No discharge. Pulmonary:      Effort: Pulmonary effort is normal.      Breath sounds: Normal breath sounds. Abdominal:      General: Abdomen is flat. Bowel sounds are normal.      Palpations: Abdomen is soft. Musculoskeletal:         General: No swelling or deformity. Normal range of motion. Cervical back: Normal range of motion. Skin:     General: Skin is warm and dry. Coloration: Skin is not jaundiced. Neurological:      General: No focal deficit present. Mental Status: He is alert and oriented to person, place, and time. Psychiatric:         Mood and Affect: Mood normal.         Behavior: Behavior normal.             MEDICAL DECISION MAKING   Initial Assessment:   1. Previously diagnosed pneumonia Covid versus other: She was diagnosed with Covid pneumonia yesterday. Patient says he is not improved. Patient is contagious shortness of breath lightheadedness. Patient is afebrile. Is not tachycardic or tachypneic.     Plan:    EKG   Repeat chest x-ray   CBC   BMP          ED RESULTS   Laboratory results:  Labs Reviewed   CBC WITH AUTO DIFFERENTIAL - Abnormal; Notable for the following components:       Result Value    MPV 9.2 (*)     All other components within normal limits   BASIC METABOLIC PANEL   TROPONIN   ANION GAP GLOMERULAR FILTRATION RATE, ESTIMATED       Radiologic studies results:  XR CHEST PORTABLE   Final Result   1. Lingular infiltrate. .               **This report has been created using voice recognition software. It may contain minor errors which are inherent in voice recognition technology. **      Final report electronically signed by DR Andres Fortune on 9/14/2021 12:13 PM          ED Medications administered this visit: Medications - No data to display      ED COURSE     ED Course as of Sep 14 1315   Tue Sep 14, 2021   1151 Patient perks out EKG has no signs of right heart strain. Patient is not tachycardic and not tachypneic satting well on room air. Clinical picture does not fit someone with a pulmonary embolism. D-dimer and CTA unnecessary pulmonary embolism is unlikely. [CRIS]   9911 Patient classified as mild Covid with a prescore of 1. [CRIS]   8893 Patient CBC unremarkable   CBC auto differential(!):    WBC 8.6   RBC 5.32   Hemoglobin Quant 16.3   Hematocrit 47.6   MCV 89.5   MCH 30.6   MCHC 34.2   RDW-CV 12.6   RDW-SD 41.1   Platelet Count 623   MPV 9.2(!)   Seg Neutrophils 67.7   Lymphocytes 17.8   Monocytes 13.7   Eosinophils 0.0   Basophils 0.2   Immature Granulocytes 0.6   Segs Absolute 5.8   Lymphocytes Absolute 1.5   Monocytes Absolute 1.2   Eosinophils Absolute 0.0   Basophils Absolute 0.0   Immature Grans (Abs) 0.05   Nucleated Red Blood Cells 0 [CRIS]   1209 Patient is not a candidate for Regeneron    [CRIS]   1315 Results and treatment plan were discussed with the patient and patient is in agreement. Patient was instructed to return if she starts experiencing worsening abdominal pain especially pain that is diffuse across the entire abdomen, pain accompanied with nausea vomiting, any lightheadedness dizziness or loss of consciousness, pain accompanied with shortness of breath as these can be signs of serious medical condition.     [CRIS]      ED Course User Index  [CRIS] Aldair Solis MD       Strict return precautions and follow up instructions were discussed with the patient prior to discharge, with which the patient agrees. MEDICATION CHANGES     Discharge Medication List as of 9/14/2021 12:25 PM            FINAL DISPOSITION     Final diagnoses:   COVID-19     Condition: condition: stable  Dispo: Discharge to home      This transcription was electronically signed. Parts of this transcriptions may have been dictated by use of voice recognition software and electronically transcribed, and parts may have been transcribed with the assistance of an ED scribe. The transcription may contain errors not detected in proofreading. Please refer to my supervising physician's documentation if my documentation differs.     Electronically Signed: Beryle Look, MD, 09/14/21, 1:15 PM         Noe Maldonado MD  Resident  09/14/21 [id number]

## 2021-09-15 ENCOUNTER — TELEPHONE (OUTPATIENT)
Dept: FAMILY MEDICINE CLINIC | Age: 31
End: 2021-09-15

## 2021-09-15 ENCOUNTER — CARE COORDINATION (OUTPATIENT)
Dept: CARE COORDINATION | Age: 31
End: 2021-09-15

## 2021-09-15 NOTE — LETTER
Tyler VALLES AM OFFESTELA CISNEROS.MEENAKSHI, [address]  Phone: [phone]  Fax: [phone]     September 15, 2021    [address] [place] [address] [facility] [address]    Dear Melissa Wilcox,    Thank you for choosing our [facility] on 9/14/21. Your Provider wanted to make sure that you understand your discharge instructions and that you were able to fill any prescriptions that may have been ordered for you. Please contact the office at the above phone number if you were advised to follow up with your Provider, or if you have any further questions or needs. Also did you know -                            [facility] practices can often offer you an appointment on the same day that you call for acute issues. *We have some [facility] offices that offer Walk-in appointments; check our website for availability in your community, [email or web address].      *Evisits are now available for patients through [address] [facility] also offers video visits through [address] If you do not have MyChart and are interested, please contact the office and a staff member may assist you or go to [email or web address].       Sincerely,     Abdoulaye Gipson DO and your [facility] [place]

## 2021-10-18 ENCOUNTER — NURSE ONLY (OUTPATIENT)
Dept: LAB | Age: 31
End: 2021-10-18

## 2021-10-18 ENCOUNTER — NURSE TRIAGE (OUTPATIENT)
Dept: OTHER | Facility: CLINIC | Age: 31
End: 2021-10-18

## 2021-10-18 ENCOUNTER — OFFICE VISIT (OUTPATIENT)
Dept: FAMILY MEDICINE CLINIC | Age: 31
End: 2021-10-18
Payer: MEDICARE

## 2021-10-18 VITALS
DIASTOLIC BLOOD PRESSURE: 74 MMHG | SYSTOLIC BLOOD PRESSURE: 126 MMHG | RESPIRATION RATE: 18 BRPM | WEIGHT: 236.1 LBS | BODY MASS INDEX: 31.15 KG/M2 | TEMPERATURE: 98.1 F | HEART RATE: 88 BPM

## 2021-10-18 DIAGNOSIS — R19.5 LOOSE STOOLS: ICD-10-CM

## 2021-10-18 DIAGNOSIS — F41.9 ANXIETY: ICD-10-CM

## 2021-10-18 DIAGNOSIS — H65.93 MIDDLE EAR EFFUSION, BILATERAL: ICD-10-CM

## 2021-10-18 DIAGNOSIS — R10.13 EPIGASTRIC ABDOMINAL PAIN: ICD-10-CM

## 2021-10-18 DIAGNOSIS — R42 DIZZINESS: ICD-10-CM

## 2021-10-18 DIAGNOSIS — H71.91 CHOLESTEATOMA OF RIGHT MIDDLE EAR: ICD-10-CM

## 2021-10-18 DIAGNOSIS — R10.13 EPIGASTRIC ABDOMINAL PAIN: Primary | ICD-10-CM

## 2021-10-18 LAB
ALBUMIN SERPL-MCNC: 5.1 G/DL (ref 3.5–5.1)
ALP BLD-CCNC: 78 U/L (ref 38–126)
ALT SERPL-CCNC: 23 U/L (ref 11–66)
AMYLASE: 35 U/L (ref 20–104)
ANION GAP SERPL CALCULATED.3IONS-SCNC: 14 MEQ/L (ref 8–16)
AST SERPL-CCNC: 16 U/L (ref 5–40)
BASOPHILS # BLD: 0.6 %
BASOPHILS ABSOLUTE: 0.1 THOU/MM3 (ref 0–0.1)
BILIRUB SERPL-MCNC: 0.5 MG/DL (ref 0.3–1.2)
BUN BLDV-MCNC: 14 MG/DL (ref 7–22)
CALCIUM SERPL-MCNC: 10 MG/DL (ref 8.5–10.5)
CHLORIDE BLD-SCNC: 99 MEQ/L (ref 98–111)
CO2: 26 MEQ/L (ref 23–33)
CREAT SERPL-MCNC: 0.9 MG/DL (ref 0.4–1.2)
EOSINOPHIL # BLD: 0.1 %
EOSINOPHILS ABSOLUTE: 0 THOU/MM3 (ref 0–0.4)
ERYTHROCYTE [DISTWIDTH] IN BLOOD BY AUTOMATED COUNT: 12.8 % (ref 11.5–14.5)
ERYTHROCYTE [DISTWIDTH] IN BLOOD BY AUTOMATED COUNT: 43.8 FL (ref 35–45)
GFR SERPL CREATININE-BSD FRML MDRD: > 90 ML/MIN/1.73M2
GLUCOSE BLD-MCNC: 115 MG/DL (ref 70–108)
HCT VFR BLD CALC: 52.1 % (ref 42–52)
HEMOGLOBIN: 16.8 GM/DL (ref 14–18)
IMMATURE GRANS (ABS): 0.05 THOU/MM3 (ref 0–0.07)
IMMATURE GRANULOCYTES: 0.4 %
LIPASE: 18.6 U/L (ref 5.6–51.3)
LYMPHOCYTES # BLD: 8.8 %
LYMPHOCYTES ABSOLUTE: 1 THOU/MM3 (ref 1–4.8)
MCH RBC QN AUTO: 30 PG (ref 26–33)
MCHC RBC AUTO-ENTMCNC: 32.2 GM/DL (ref 32.2–35.5)
MCV RBC AUTO: 93 FL (ref 80–94)
MONOCYTES # BLD: 4.5 %
MONOCYTES ABSOLUTE: 0.5 THOU/MM3 (ref 0.4–1.3)
NUCLEATED RED BLOOD CELLS: 0 /100 WBC
PLATELET # BLD: 383 THOU/MM3 (ref 130–400)
PMV BLD AUTO: 9.5 FL (ref 9.4–12.4)
POTASSIUM SERPL-SCNC: 4.1 MEQ/L (ref 3.5–5.2)
RBC # BLD: 5.6 MILL/MM3 (ref 4.7–6.1)
SEG NEUTROPHILS: 85.6 %
SEGMENTED NEUTROPHILS ABSOLUTE COUNT: 9.8 THOU/MM3 (ref 1.8–7.7)
SODIUM BLD-SCNC: 139 MEQ/L (ref 135–145)
TOTAL PROTEIN: 8 G/DL (ref 6.1–8)
TSH SERPL DL<=0.05 MIU/L-ACNC: 1.39 UIU/ML (ref 0.4–4.2)
WBC # BLD: 11.4 THOU/MM3 (ref 4.8–10.8)

## 2021-10-18 PROCEDURE — G8417 CALC BMI ABV UP PARAM F/U: HCPCS | Performed by: FAMILY MEDICINE

## 2021-10-18 PROCEDURE — 1036F TOBACCO NON-USER: CPT | Performed by: FAMILY MEDICINE

## 2021-10-18 PROCEDURE — G8484 FLU IMMUNIZE NO ADMIN: HCPCS | Performed by: FAMILY MEDICINE

## 2021-10-18 PROCEDURE — G8427 DOCREV CUR MEDS BY ELIG CLIN: HCPCS | Performed by: FAMILY MEDICINE

## 2021-10-18 PROCEDURE — 99214 OFFICE O/P EST MOD 30 MIN: CPT | Performed by: FAMILY MEDICINE

## 2021-10-18 RX ORDER — PANTOPRAZOLE SODIUM 40 MG/1
40 TABLET, DELAYED RELEASE ORAL
Qty: 30 TABLET | Refills: 0 | Status: SHIPPED | OUTPATIENT
Start: 2021-10-18 | End: 2021-11-09

## 2021-10-18 RX ORDER — BUSPIRONE HYDROCHLORIDE 7.5 MG/1
7.5 TABLET ORAL 2 TIMES DAILY
Qty: 60 TABLET | Refills: 0 | Status: SHIPPED | OUTPATIENT
Start: 2021-10-18 | End: 2021-11-09

## 2021-10-18 RX ORDER — ALBUTEROL SULFATE 90 UG/1
2 AEROSOL, METERED RESPIRATORY (INHALATION) EVERY 4 HOURS PRN
COMMUNITY
Start: 2021-09-13

## 2021-10-18 SDOH — ECONOMIC STABILITY: FOOD INSECURITY: WITHIN THE PAST 12 MONTHS, YOU WORRIED THAT YOUR FOOD WOULD RUN OUT BEFORE YOU GOT MONEY TO BUY MORE.: NEVER TRUE

## 2021-10-18 SDOH — ECONOMIC STABILITY: FOOD INSECURITY: WITHIN THE PAST 12 MONTHS, THE FOOD YOU BOUGHT JUST DIDN'T LAST AND YOU DIDN'T HAVE MONEY TO GET MORE.: NEVER TRUE

## 2021-10-18 ASSESSMENT — PATIENT HEALTH QUESTIONNAIRE - PHQ9
SUM OF ALL RESPONSES TO PHQ QUESTIONS 1-9: 0
SUM OF ALL RESPONSES TO PHQ QUESTIONS 1-9: 0
2. FEELING DOWN, DEPRESSED OR HOPELESS: 0
SUM OF ALL RESPONSES TO PHQ QUESTIONS 1-9: 0
1. LITTLE INTEREST OR PLEASURE IN DOING THINGS: 0
SUM OF ALL RESPONSES TO PHQ9 QUESTIONS 1 & 2: 0

## 2021-10-18 ASSESSMENT — ENCOUNTER SYMPTOMS
CONSTIPATION: 0
BLOOD IN STOOL: 0
ABDOMINAL PAIN: 1
NAUSEA: 0
RESPIRATORY NEGATIVE: 1
VOMITING: 0
DIARRHEA: 1

## 2021-10-18 ASSESSMENT — SOCIAL DETERMINANTS OF HEALTH (SDOH): HOW HARD IS IT FOR YOU TO PAY FOR THE VERY BASICS LIKE FOOD, HOUSING, MEDICAL CARE, AND HEATING?: NOT HARD AT ALL

## 2021-10-18 NOTE — PROGRESS NOTES
Justin Duran (:  1990) is a 31 y.o. male,Established patient, here for evaluation of the following chief complaint(s):  Abdominal Pain (upper mid started a week and a half ago), Diarrhea, Dizziness, Anxiety, Otalgia (bilateral), and Fatigue        Subjective   SUBJECTIVE/OBJECTIVE:  HPI:    Chief Complaint   Patient presents with    Abdominal Pain     upper mid started a week and a half ago    Diarrhea    Dizziness    Anxiety    Otalgia     bilateral    Fatigue     Pt presents with multiple complaints today. Pt c/o dizziness and ear pain that began yesterday. Some sinus pressure. Hx of cholesteatoma on the right. Stomach pain, loose stools for the last 10 days. No fevers. Was on Keflex 1-2 weeks ago. This was given for an infection that he had on his leg as well as an abx for pneumonia post-COVID. Will have formed stools at times. Down 14 lbs from September. Wt Readings from Last 3 Encounters:   10/18/21 236 lb 1.6 oz (107.1 kg)   21 250 lb (113.4 kg)   20 252 lb 8 oz (114.5 kg)     Anxiety not well controlled. On Lexapro and prn Atarax. Only takes 1/2 tab Atarax due to drowsiness.       Patient Active Problem List   Diagnosis    EMIL (serous otitis media)    Dysfunction of eustachian tube    Cholesteatoma, middle ear    Conductive hearing loss in right ear    Dizzy    Acute myopericarditis     Past Surgical History:   Procedure Laterality Date    HAND SURGERY  2007    MASTOIDECTOMY Right 2013    SHOULDER SURGERY  2007    TYMPANOSTOMY TUBE PLACEMENT      dr. Beltran Glass    VASECTOMY       Social History     Tobacco Use    Smoking status: Former Smoker     Packs/day: 1.00     Years: 0.50     Pack years: 0.50     Types: Cigarettes     Quit date: 2010     Years since quittin.1    Smokeless tobacco: Former User     Types: [address] date: 2018   Vaping Use    Vaping Use: Never used   Substance Use Topics    Alcohol use: Yes     Comment: occasPegge Mark Drug use: No       Review of Systems   Constitutional: Positive for appetite change and unexpected weight change. Negative for fever. HENT: Positive for congestion and ear pain. Negative for ear discharge. Respiratory: Negative. Cardiovascular: Negative. Gastrointestinal: Positive for abdominal pain and diarrhea. Negative for blood in stool, constipation, nausea and vomiting. Musculoskeletal: Negative. Neurological: Positive for dizziness. Negative for headaches. All other systems reviewed and are negative. Objective   Physical Exam  Vitals and nursing note reviewed. Constitutional:       General: He is not in acute distress. Appearance: Normal appearance. He is well-developed. HENT:      Head: Normocephalic and atraumatic. Right Ear: A middle ear effusion is present. Left Ear: A middle ear effusion is present. Eyes:      Conjunctiva/sclera: Conjunctivae normal.   Cardiovascular:      Rate and Rhythm: Normal rate and regular rhythm. Heart sounds: Normal heart sounds. No murmur heard. Pulmonary:      Effort: Pulmonary effort is normal.      Breath sounds: Normal breath sounds. No wheezing, rhonchi or rales. Abdominal:      General: There is no distension. Tenderness: There is abdominal tenderness in the epigastric area. There is no right CVA tenderness, left CVA tenderness, guarding or rebound. Negative signs include Goodwin's sign and McBurney's sign. Musculoskeletal:      Cervical back: Neck supple. Skin:     General: Skin is warm and dry. Findings: No rash (on exposed surfaces). Neurological:      General: No focal deficit present. Mental Status: He is alert. Psychiatric:         Attention and Perception: Attention normal.         Mood and Affect: Mood normal.         Speech: Speech normal.         Behavior: Behavior normal. Behavior is cooperative. Thought Content:  Thought content normal.         Judgment: Judgment

## 2021-10-18 NOTE — TELEPHONE ENCOUNTER
(4-7): interferes with normal activities or awakens from sleep, tender to touch      - SEVERE (8-10): excruciating pain, doubled over, unable to do any normal activities        5/10 pain     7. RECURRENT SYMPTOM: \"Have you ever had this type of abdominal pain before? \" If so, ask: \"When was the last time? \" and \"What happened that time? \"   had abd pain in the past, never had medical diagnosis         8. CAUSE: \"What do you think is causing the abdominal pain? \"      When eating. 9. RELIEVING/AGGRAVATING FACTORS: \"What makes it better or worse? \" (e.g., movement, antacids, bowel movement)      Eat/ drink makes worse    10. OTHER SYMPTOMS: \"Has there been any vomiting, diarrhea, constipation, or urine problems? \"        Diarrhea - started about same time as stomach pain    Protocols used: ABDOMINAL PAIN - MALE-ADULT-

## 2021-10-20 ENCOUNTER — OFFICE VISIT (OUTPATIENT)
Dept: CARDIOLOGY CLINIC | Age: 31
End: 2021-10-20
Payer: MEDICARE

## 2021-10-20 VITALS
SYSTOLIC BLOOD PRESSURE: 144 MMHG | HEIGHT: 73 IN | WEIGHT: 242.6 LBS | HEART RATE: 71 BPM | DIASTOLIC BLOOD PRESSURE: 87 MMHG | BODY MASS INDEX: 32.15 KG/M2

## 2021-10-20 DIAGNOSIS — R06.09 DOE (DYSPNEA ON EXERTION): Primary | ICD-10-CM

## 2021-10-20 DIAGNOSIS — R00.2 PALPITATION: ICD-10-CM

## 2021-10-20 DIAGNOSIS — I20.8 ANGINA OF EFFORT (HCC): ICD-10-CM

## 2021-10-20 PROCEDURE — G8484 FLU IMMUNIZE NO ADMIN: HCPCS | Performed by: INTERNAL MEDICINE

## 2021-10-20 PROCEDURE — G8427 DOCREV CUR MEDS BY ELIG CLIN: HCPCS | Performed by: INTERNAL MEDICINE

## 2021-10-20 PROCEDURE — G8417 CALC BMI ABV UP PARAM F/U: HCPCS | Performed by: INTERNAL MEDICINE

## 2021-10-20 PROCEDURE — 99204 OFFICE O/P NEW MOD 45 MIN: CPT | Performed by: INTERNAL MEDICINE

## 2021-10-20 NOTE — PROGRESS NOTES
[address] [facility] [place] [place] [address] [place] [address]  Dept: [phone]  Dept Fax: [phone]  Loc: [phone]    Visit Date: 10/20/2021    Mr. Olinda Mehta is a 31 y.o. male  who presented for:    New patient  Chest pain, Palpitations     HPI:   SACHI Fischer is a pleasant 31year old male patient who  has a past medical history of Acute myopericarditis, Anxiety, Cholesteatoma, Headache(784.0), and Migraine. He has family history of MI/CAD in his grandfather. He had COVID PNA last month, was treated at home (steroids) and was recently treated for cellulitis with Abx. He reports prior history pericarditis in 2014. The patient was referred for recurrent palpitations, chest pain. He states that chest pain is retrosternal, non-radiating, described as tightness, non-exertional, mild. He has worsening dyspnea on exertion, gets tired easily. Reports shortness of breath. Has occasional dizziness. Patient denies orthopnea, paroxysmal nocturnal dyspnea, syncope, weight gain or leg swelling. Current Outpatient Medications:     albuterol sulfate  (90 Base) MCG/ACT inhaler, Inhale 2 puffs into the lungs every 4 hours as needed, Disp: , Rfl:     busPIRone (BUSPAR) 7.5 MG tablet, Take 1 tablet by mouth 2 times daily, Disp: 60 tablet, Rfl: 0    pantoprazole (PROTONIX) 40 MG tablet, Take 1 tablet by mouth daily (with breakfast), Disp: 30 tablet, Rfl: 0    escitalopram (LEXAPRO) 20 MG tablet, TAKE 1 TABLET BY MOUTH EVERY DAY, Disp: 90 tablet, Rfl: 3    hydrOXYzine (ATARAX) 25 MG tablet, Take 1 tablet by mouth every 8 hours as needed for Anxiety, Disp: 40 tablet, Rfl: 2    Past Medical History  Mary Lou Almanzar  has a past medical history of Acute myopericarditis, Anxiety, Cholesteatoma, Headache(784.0), and Migraine. Social History  Mary Lou Almanzar  reports that he quit smoking about 11 years ago. His smoking use included cigarettes.  He has a 0.50 pack-year smoking history. He quit smokeless tobacco use about 3 years ago. His smokeless tobacco use included chew. He reports current alcohol use. He reports that he does not use drugs. Family History  Bismark Abbott family history includes Thyroid Disease in his mother. Past Surgical History   Past Surgical History:   Procedure Laterality Date    HAND SURGERY  2007    MASTOIDECTOMY Right 07/2013   [place] SHOULDER SURGERY  2007    TYMPANOSTOMY TUBE PLACEMENT  2010    dr. Jeanie Herron    VASECTOMY         Review of Systems   Constitutional: Negative for chills and fever  HENT: Negative for congestion, sinus pressure, sneezing and sore throat. Eyes: Negative for pain, discharge, redness and itching. Respiratory: Negative for apnea, cough  Gastrointestinal: Negative for blood in stool, constipation, diarrhea   Endocrine: Negative for cold intolerance, heat intolerance, polydipsia. Genitourinary: Negative for dysuria, enuresis, flank pain and hematuria. Musculoskeletal: Negative for arthralgias, joint swelling and neck pain. Neurological: Negative for numbness and headaches. Psychiatric/Behavioral: Negative for agitation, confusion, decreased concentration and dysphoric mood. Objective:     BP (!) 144/87   Pulse 71   Ht 6' 1\" (1.854 m)   Wt 242 lb 9.6 oz (110 kg)   BMI 32.01 kg/m²     Wt Readings from Last 3 Encounters:   10/20/21 242 lb 9.6 oz (110 kg)   10/18/21 236 lb 1.6 oz (107.1 kg)   09/14/21 250 lb (113.4 kg)     BP Readings from Last 3 Encounters:   10/20/21 (!) 144/87   10/18/21 126/74   09/14/21 (!) 143/98       Nursing note and vitals reviewed. Physical Exam   Constitutional: Oriented to person, place, and time. Appears well-developed and well-nourished. ENT: Moist mucous membranes. No bleeding. Tongue is midline. Head: Normocephalic and atraumatic. Eyes: EOM are normal. Pupils are equal, round, and reactive to light. Neck: Normal range of motion. Neck supple. No JVD present. Cardiovascular: Normal rate, regular rhythm, no murmur, no rubs, and intact distal pulses. Pulmonary/Chest: Effort normal and breath sounds normal. No respiratory distress. No wheezes. No rales. Abdominal: Soft. Bowel sounds are normal. No distension. There is no tenderness. Musculoskeletal: Normal range of motion. no edema. Neurological: Alert and oriented to person, place, and time. No cranial nerve deficit. Coordination normal.   Skin: Skin is warm and dry. Psychiatric: Normal mood and affect.        Lab Results   Component Value Date    CKTOTAL 253 01/24/2014    CKTOTAL 138 01/16/2014    CKTOTAL 119 01/10/2014    CKMB 24.5 01/08/2014       Lab Results   Component Value Date    WBC 11.4 10/18/2021    RBC 5.60 10/18/2021    HGB 16.8 10/18/2021    HCT 52.1 10/18/2021    MCV 93.0 10/18/2021    MCH 30.0 10/18/2021    MCHC 32.2 10/18/2021    RDW 13.1 05/09/2016     10/18/2021    MPV 9.5 10/18/2021       Lab Results   Component Value Date     10/18/2021    K 4.1 10/18/2021    CL 99 10/18/2021    CO2 26 10/18/2021    BUN 14 10/18/2021    LABALBU 5.1 10/18/2021    CREATININE 0.9 10/18/2021    CALCIUM 10.0 10/18/2021    LABGLOM >90 10/18/2021    GLUCOSE 115 10/18/2021       Lab Results   Component Value Date    ALKPHOS 78 10/18/2021    ALT 23 10/18/2021    AST 16 10/18/2021    PROT 8.0 10/18/2021    BILITOT 0.5 10/18/2021    BILIDIR 0.3 01/09/2014    LABALBU 5.1 10/18/2021       Lab Results   Component Value Date    MG 2.0 09/14/2018       No results found for: INR, PROTIME      No results found for: LABA1C    Lab Results   Component Value Date    TRIG 87 01/09/2014    HDL 33 01/09/2014    LDLCALC 63 01/09/2014       Lab Results   Component Value Date    TSH 1.390 10/18/2021         Testing Reviewed:      I have individually reviewed the cardiac test below:    ECHO: Results for orders placed during the hospital encounter of 11/07/18    ECHO Complete 2D W Doppler W Color    Narrative  Transthoracic Echocardiography Report (TTE)    Demographics    Patient Name   Adriana Reaves Gender               Male    MR #           [id number]     Race                     Ethnicity    Account #      [de-identified]     Room Number    Accession      [id number]     Date of Study        11/07/2018  Number    Date of Birth  1990    Referring Physician  El King CNP    Age            34 year(s)    Sonographer          Elton Alvarez  T    Interpreting         Calla Lesch MD  Physician    Procedure    Type of Study    TTE procedure:ECHOCARDIOGRAM COMPLETE 2D W DOPPLER W COLOR. Procedure Date  Date: 11/07/2018 Start: 09:08 AM    Study Location: Echo Lab  Technical Quality: Adequate visualization    Indications:Palpitations. Patient Status: Routine    Height: 73 inches Weight: 214 pounds BSA: 2.21 m^2 BMI: 28.23 kg/m^2    BP: 140/98 mmHg    Conclusions    Summary  Ejection fraction is visually estimated at 60%. Overall left ventricular function is normal.    Signature    ----------------------------------------------------------------  Electronically signed by Calla Lesch MD (Interpreting  physician) on 11/08/2018 at 05:53 PM  ----------------------------------------------------------------    Findings    Mitral Valve  The mitral valve structure was normal with normal leaflet separation. DOPPLER: The transmitral velocity was within the normal range with no  evidence for mitral stenosis. There was no evidence of mitral  regurgitation. Aortic Valve  The aortic valve was trileaflet with normal thickness and cuspal  separation. DOPPLER: Transaortic velocity was within the normal range with  no evidence of aortic stenosis. There was no evidence of aortic  regurgitation. Tricuspid Valve  Tricuspid valve was not well visualized. Trivial tricuspid regurgitation visualized. Pulmonic Valve  The pulmonic valve was not well visualized .   Trivial pulmonic regurgitation visualized. Left Atrium  Left atrial size was normal.    Left Ventricle  Ejection fraction is visually estimated at 60%. Overall left ventricular function is normal.    Right Atrium  Right atrial size was normal.    Right Ventricle  The right ventricular size was normal with normal systolic function and  wall thickness. Pericardial Effusion  The pericardium was normal in appearance with no evidence of a pericardial  effusion. Pleural Effusion  No evidence of pleural effusion. Aorta / Great Vessels  -Aortic root dimension within normal limits.  -The Pulmonary artery is within normal limits. -IVC size is within normal limits with normal respiratory phasic changes.     M-Mode/2D Measurements & Calculations    LV Diastolic   LV Systolic Dimension:    AV Cusp Separation: 2.3 cmLA  Dimension: 5.6 3.7 cm                    Dimension: 3.2 cmAO Root  cm             LV Volume Diastolic: 658  Dimension: 3.1 cmLA Area: 17.7  LV FS:33.9 %   ml                        cm^2  LV PW          LV Volume Systolic: 11.4  Diastolic: 1   ml  cm             LV EDV/LV EDV Index: 176  Septum         ml/80 m^2LV ESV/LV ESV    RV Diastolic Dimension: 3.1 cm  Diastolic: 0.7 Index: 56.9 ml/23 m^2  cm             EF Calculated: 71.2 %     LA/Aorta: 1.03    LA volume/Index: 49.1 ml /22m^2    Doppler Measurements & Calculations    MV Peak E-Wave: 82.6 cm/s   AV Peak Velocity: 152  LVOT Peak Velocity: 129  MV Peak A-Wave: 60 cm/s     cm/s                   cm/s  MV E/A Ratio: 1.38          AV Peak Gradient: 9.24 LVOT Peak Gradient: 7  MV Peak Gradient: 2.73 mmHg mmHg                   mmHg    MV Deceleration Time: 268                          TV Peak E-Wave: 58.1  msec                                               cm/s  TV Peak A-Wave: 37.9  IVRT: 74 msec          cm/s  MV E' Septal Velocity: 12.7  cm/s                                               TV Peak Gradient: 1.35  MV A' Septal Velocity: 10.1 AV DVI (Vmax):0.85     mmHg  cm/s  MV E' Lateral Velocity:                            PV Peak Velocity: 125  16.2 cm/s                                          cm/s  MV A' Lateral Velocity: 7.3                        PV Peak Gradient: 6.25  cm/s                                               mmHg  E/E' septal: 6.5  E/E' lateral: 5.1    [email or web address]  [id number]       Ekg:   EKG Interpretation:  normal EKG, normal sinus rhythm, unchanged from previous tracings. Tilt table test                                   TILT TABLE TEST     PATIENT NAME: Yudith Thakkar.                :  1990  MEDICAL RECORD NO: [id number]                 ROOM: OP  ACCOUNT NO: [de-identified]                          DATE: 06/10/2016  PHYSICIAN: Jean Kohler D.O. Tilt table test for vasodepressor cardioinhibitory symptoms. The patient was  examined. Heart regular rate and rhythm. Lungs clear. Abdomen soft,  nontender. EKG showed a normal sinus rhythm. Resting blood pressure 125/81. The patient was placed in the upright position and monitored for a total of  30 minutes. During the monitoring process the patient remained completely  asymptomatic. Hemodynamic response was also normal in that the patient's  blood pressure was 125/81 and did not fluctuate a lot. The lowest being  118/84. CONCLUSION:    1. Negative tilt table test for vasodepressor cardioinhibitory symptoms. 2.    The patient remained completely asymptomatic. 3.    No arrhythmias were noted. Jean Kohler D.O. AssessmentPlan:   Leisa Hancock is a pleasant 31year old male patient who  has a past medical history of Acute myopericarditis, Anxiety, Cholesteatoma, Headache(784.0), and Migraine. He has family history of MI/CAD in his grandfather. He had COVID PNA last month, was treated at home (steroids) and was recently treated for cellulitis with Abx.  He reports prior history pericarditis in 2014. The patient was referred for recurrent palpitations, chest pain. He states that chest pain is retrosternal, non-radiating, described as tightness, non-exertional, mild. He has worsening dyspnea on exertion, gets tired easily. Reports shortness of breath. Has occasional dizziness. Patient denies orthopnea, paroxysmal nocturnal dyspnea, syncope, weight gain or leg swelling. 1. Chest pain   2. Dyspnea on exertion   3. Palpitations   4. Recent COVID infection, cellulitis   5. Prior h/o pericarditis   6. FH of CAD  7. Anxiety      Symptoms became more prominent since recent COVID infection   Has FH of CAD   DDx includes arrhythmia, CHF, anxiety, CAD, post COVID PF   Symptoms are concerning for possible underlying rhythm problems, EKG was reviewed. Will proceed with a 48 Holter monitor    Patient was advised to report to the ER if has recurrent symptoms with specific instructions given about severity and duration of symptoms   Based on patient's risk factors and clinical presentation, stress test is indicated to investigate for possible underlying ischemic heart disease. Patient  agrees with that work up and its risks and benefits and potential need for additional testing if stress test is abnormal such as cardiac catheterization   Will need to investigate for underlying structural heart disease, will proceed with a transthoracic echocardiogram       Above findings and plan of care were discussed with patient in details, patient's questions were answered.      Disposition:  RTC PRN             Electronically signed by Rosie Patton MD, [facility]    10/20/2021 at 3:59 PM EDT

## 2021-10-20 NOTE — PROGRESS NOTES
Last EKG was done on: 09/14/2021    Patient denies having any chest pain and RADHA    Pt c/o:shortness of breath, dizziness, lightheadedness, palpitations

## 2021-11-08 ENCOUNTER — HOSPITAL ENCOUNTER (OUTPATIENT)
Dept: NON INVASIVE DIAGNOSTICS | Age: 31
Discharge: HOME | End: 2021-11-08
Payer: MEDICARE

## 2021-11-08 VITALS — BODY MASS INDEX: 31.81 KG/M2 | HEIGHT: 73 IN | WEIGHT: 240 LBS

## 2021-11-08 DIAGNOSIS — I20.8 ANGINA OF EFFORT (HCC): ICD-10-CM

## 2021-11-08 DIAGNOSIS — R00.2 PALPITATION: ICD-10-CM

## 2021-11-08 DIAGNOSIS — R06.09 DOE (DYSPNEA ON EXERTION): ICD-10-CM

## 2021-11-08 LAB
LV EF: 55 %
LVEF MODALITY: NORMAL

## 2021-11-08 PROCEDURE — 93306 TTE W/DOPPLER COMPLETE: CPT

## 2021-11-08 PROCEDURE — 93226 XTRNL ECG REC<48 HR SCAN A/R: CPT

## 2021-11-08 PROCEDURE — 93017 CV STRESS TEST TRACING ONLY: CPT | Performed by: INTERNAL MEDICINE

## 2021-11-08 PROCEDURE — 93225 XTRNL ECG REC<48 HRS REC: CPT

## 2021-12-08 LAB
ACQUISITION DURATION: NORMAL S
AVERAGE HEART RATE: 78 BPM
HOOKUP DATE: NORMAL
HOOKUP TIME: NORMAL
MAX HEART RATE TIME/DATE: NORMAL
MAX HEART RATE: 153 BPM
MIN HEART RATE TIME/DATE: NORMAL
MIN HEART RATE: 48 BPM
NUMBER OF QRS COMPLEXES: NORMAL
NUMBER OF SUPRAVENTRICULAR COUPLETS: 0
NUMBER OF SUPRAVENTRICULAR ECTOPICS: 0
NUMBER OF SUPRAVENTRICULAR ISOLATED BEATS: 0
NUMBER OF VENTRICULAR BIGEMINAL CYCLES: 0
NUMBER OF VENTRICULAR COUPLETS: 0
NUMBER OF VENTRICULAR ECTOPICS: 1

## 2022-03-17 DIAGNOSIS — F41.9 ANXIETY: ICD-10-CM

## 2022-03-17 RX ORDER — ESCITALOPRAM OXALATE 20 MG/1
TABLET ORAL
Qty: 90 TABLET | Refills: 3 | Status: SHIPPED | OUTPATIENT
Start: 2022-03-17

## 2022-05-10 DIAGNOSIS — F41.9 ANXIETY: ICD-10-CM

## 2022-05-10 RX ORDER — BUSPIRONE HYDROCHLORIDE 7.5 MG/1
TABLET ORAL
Qty: 180 TABLET | Refills: 1 | Status: SHIPPED | OUTPATIENT
Start: 2022-05-10 | End: 2022-09-19

## 2022-09-18 DIAGNOSIS — F41.9 ANXIETY: ICD-10-CM

## 2022-09-19 RX ORDER — BUSPIRONE HYDROCHLORIDE 7.5 MG/1
TABLET ORAL
Qty: 60 TABLET | Refills: 5 | Status: SHIPPED | OUTPATIENT
Start: 2022-09-19

## 2023-04-05 DIAGNOSIS — F41.9 ANXIETY: ICD-10-CM

## 2023-04-05 RX ORDER — ESCITALOPRAM OXALATE 20 MG/1
TABLET ORAL
Qty: 90 TABLET | Refills: 3 | OUTPATIENT
Start: 2023-04-05

## 2023-04-05 RX ORDER — ESCITALOPRAM OXALATE 20 MG/1
20 TABLET ORAL DAILY
Qty: 30 TABLET | Refills: 0 | Status: SHIPPED | OUTPATIENT
Start: 2023-04-05 | End: 2023-04-13 | Stop reason: SDUPTHER

## 2023-06-29 ENCOUNTER — HOSPITAL ENCOUNTER (EMERGENCY)
Age: 33
Discharge: HOME | End: 2023-06-29
Attending: STUDENT IN AN ORGANIZED HEALTH CARE EDUCATION/TRAINING PROGRAM
Payer: MEDICAID

## 2023-06-29 VITALS
SYSTOLIC BLOOD PRESSURE: 128 MMHG | RESPIRATION RATE: 15 BRPM | TEMPERATURE: 98.3 F | DIASTOLIC BLOOD PRESSURE: 70 MMHG | OXYGEN SATURATION: 99 % | HEART RATE: 70 BPM | WEIGHT: 240 LBS | HEIGHT: 73 IN | BODY MASS INDEX: 31.81 KG/M2

## 2023-06-29 DIAGNOSIS — R19.7 DIARRHEA OF PRESUMED INFECTIOUS ORIGIN: Primary | ICD-10-CM

## 2023-06-29 LAB
ALBUMIN SERPL BCG-MCNC: 4.4 G/DL (ref 3.5–5.1)
ALP SERPL-CCNC: 78 U/L (ref 38–126)
ALT SERPL W/O P-5'-P-CCNC: 54 U/L (ref 11–66)
ANION GAP SERPL CALC-SCNC: 14 MEQ/L (ref 8–16)
AST SERPL-CCNC: 35 U/L (ref 5–40)
BASOPHILS ABSOLUTE: 0.1 THOU/MM3 (ref 0–0.1)
BASOPHILS NFR BLD AUTO: 1 %
BILIRUB SERPL-MCNC: 0.5 MG/DL (ref 0.3–1.2)
BUN SERPL-MCNC: 13 MG/DL (ref 7–22)
CALCIUM SERPL-MCNC: 8.6 MG/DL (ref 8.5–10.5)
CHLORIDE SERPL-SCNC: 102 MEQ/L (ref 98–111)
CO2 SERPL-SCNC: 25 MEQ/L (ref 23–33)
CREAT SERPL-MCNC: 0.9 MG/DL (ref 0.4–1.2)
DEPRECATED RDW RBC AUTO: 40.8 FL (ref 35–45)
EOSINOPHIL NFR BLD AUTO: 1 %
EOSINOPHILS ABSOLUTE: 0.1 THOU/MM3 (ref 0–0.4)
ERYTHROCYTE [DISTWIDTH] IN BLOOD BY AUTOMATED COUNT: 12.6 % (ref 11.5–14.5)
GFR SERPL CREATININE-BSD FRML MDRD: > 60 ML/MIN/1.73M2
GLUCOSE SERPL-MCNC: 97 MG/DL (ref 70–108)
HCT VFR BLD AUTO: 43.1 % (ref 42–52)
HGB BLD-MCNC: 14.5 GM/DL (ref 14–18)
IMM GRANULOCYTES # BLD AUTO: 0.01 THOU/MM3 (ref 0–0.07)
IMM GRANULOCYTES NFR BLD AUTO: 0.2 %
LIPASE SERPL-CCNC: 19 U/L (ref 5.6–51.3)
LYMPHOCYTES ABSOLUTE: 2.1 THOU/MM3 (ref 1–4.8)
LYMPHOCYTES NFR BLD AUTO: 40 %
MAGNESIUM SERPL-MCNC: 2.4 MG/DL (ref 1.6–2.4)
MCH RBC QN AUTO: 29.8 PG (ref 26–33)
MCHC RBC AUTO-ENTMCNC: 33.6 GM/DL (ref 32.2–35.5)
MCV RBC AUTO: 88.5 FL (ref 80–94)
MONOCYTES ABSOLUTE: 0.7 THOU/MM3 (ref 0.4–1.3)
MONOCYTES NFR BLD AUTO: 14 %
NEUTROPHILS NFR BLD AUTO: 43.8 %
NRBC BLD AUTO-RTO: 0 /100 WBC
OSMOLALITY SERPL CALC.SUM OF ELEC: 281.3 MOSMOL/KG (ref 275–300)
PLATELET # BLD AUTO: 269 THOU/MM3 (ref 130–400)
PLATELET BLD QL SMEAR: ADEQUATE
PMV BLD AUTO: 9.2 FL (ref 9.4–12.4)
POTASSIUM SERPL-SCNC: 3.5 MEQ/L (ref 3.5–5.2)
PROT SERPL-MCNC: 7.4 G/DL (ref 6.1–8)
RBC # BLD AUTO: 4.87 MILL/MM3 (ref 4.7–6.1)
SCAN OF BLOOD SMEAR: NORMAL
SEGMENTED NEUTROPHILS ABSOLUTE COUNT: 2.3 THOU/MM3 (ref 1.8–7.7)
SODIUM SERPL-SCNC: 141 MEQ/L (ref 135–145)
VARIANT LYMPHS BLD QL SMEAR: ABNORMAL %
WBC # BLD AUTO: 5.2 THOU/MM3 (ref 4.8–10.8)

## 2023-06-29 PROCEDURE — 83735 ASSAY OF MAGNESIUM: CPT

## 2023-06-29 PROCEDURE — 36415 COLL VENOUS BLD VENIPUNCTURE: CPT

## 2023-06-29 PROCEDURE — 85025 COMPLETE CBC W/AUTO DIFF WBC: CPT

## 2023-06-29 PROCEDURE — 6370000000 HC RX 637 (ALT 250 FOR IP): Performed by: STUDENT IN AN ORGANIZED HEALTH CARE EDUCATION/TRAINING PROGRAM

## 2023-06-29 PROCEDURE — 99284 EMERGENCY DEPT VISIT MOD MDM: CPT

## 2023-06-29 PROCEDURE — 83690 ASSAY OF LIPASE: CPT

## 2023-06-29 PROCEDURE — 93005 ELECTROCARDIOGRAM TRACING: CPT | Performed by: STUDENT IN AN ORGANIZED HEALTH CARE EDUCATION/TRAINING PROGRAM

## 2023-06-29 PROCEDURE — 80053 COMPREHEN METABOLIC PANEL: CPT

## 2023-06-29 RX ORDER — ONDANSETRON 4 MG/1
4 TABLET, ORALLY DISINTEGRATING ORAL 3 TIMES DAILY PRN
Qty: 21 TABLET | Refills: 0 | Status: SHIPPED | OUTPATIENT
Start: 2023-06-29

## 2023-06-29 RX ORDER — DICYCLOMINE HYDROCHLORIDE 10 MG/1
10 CAPSULE ORAL ONCE
Status: COMPLETED | OUTPATIENT
Start: 2023-06-29 | End: 2023-06-29

## 2023-06-29 RX ORDER — DICYCLOMINE HCL 20 MG
20 TABLET ORAL 4 TIMES DAILY
Qty: 20 TABLET | Refills: 0 | Status: SHIPPED | OUTPATIENT
Start: 2023-06-29 | End: 2023-07-04

## 2023-06-29 RX ORDER — ONDANSETRON 4 MG/1
4 TABLET, ORALLY DISINTEGRATING ORAL ONCE
Status: COMPLETED | OUTPATIENT
Start: 2023-06-29 | End: 2023-06-29

## 2023-06-29 RX ADMIN — ONDANSETRON 4 MG: 4 TABLET, ORALLY DISINTEGRATING ORAL at 22:06

## 2023-06-29 RX ADMIN — DICYCLOMINE HYDROCHLORIDE 10 MG: 10 CAPSULE ORAL at 22:06

## 2023-06-29 ASSESSMENT — PAIN DESCRIPTION - LOCATION: LOCATION: ABDOMEN

## 2023-06-29 ASSESSMENT — PAIN - FUNCTIONAL ASSESSMENT
PAIN_FUNCTIONAL_ASSESSMENT: 0-10
PAIN_FUNCTIONAL_ASSESSMENT: NONE - DENIES PAIN
PAIN_FUNCTIONAL_ASSESSMENT: NONE - DENIES PAIN

## 2023-06-29 ASSESSMENT — PAIN SCALES - GENERAL: PAINLEVEL_OUTOF10: 1

## 2023-06-29 ASSESSMENT — PAIN DESCRIPTION - FREQUENCY: FREQUENCY: INTERMITTENT

## 2023-06-29 ASSESSMENT — PAIN DESCRIPTION - PAIN TYPE: TYPE: ACUTE PAIN

## 2023-06-30 ENCOUNTER — TELEPHONE (OUTPATIENT)
Dept: FAMILY MEDICINE CLINIC | Age: 33
End: 2023-06-30

## 2023-06-30 PROCEDURE — 93010 ELECTROCARDIOGRAM REPORT: CPT | Performed by: INTERNAL MEDICINE

## 2023-07-01 LAB
EKG ATRIAL RATE: 81 BPM
EKG P AXIS: 65 DEGREES
EKG P-R INTERVAL: 190 MS
EKG Q-T INTERVAL: 372 MS
EKG QRS DURATION: 98 MS
EKG QTC CALCULATION (BAZETT): 432 MS
EKG R AXIS: 31 DEGREES
EKG T AXIS: 53 DEGREES
EKG VENTRICULAR RATE: 81 BPM

## 2023-10-03 ENCOUNTER — OFFICE VISIT (OUTPATIENT)
Dept: FAMILY MEDICINE CLINIC | Age: 33
End: 2023-10-03
Payer: COMMERCIAL

## 2023-10-03 VITALS
HEART RATE: 68 BPM | RESPIRATION RATE: 16 BRPM | BODY MASS INDEX: 33.83 KG/M2 | SYSTOLIC BLOOD PRESSURE: 138 MMHG | WEIGHT: 256.4 LBS | DIASTOLIC BLOOD PRESSURE: 88 MMHG

## 2023-10-03 DIAGNOSIS — M70.42 PREPATELLAR BURSITIS OF LEFT KNEE: Primary | ICD-10-CM

## 2023-10-03 PROCEDURE — 99213 OFFICE O/P EST LOW 20 MIN: CPT | Performed by: NURSE PRACTITIONER

## 2023-10-03 RX ORDER — PREDNISONE 50 MG/1
50 TABLET ORAL DAILY
Qty: 5 TABLET | Refills: 0 | Status: SHIPPED | OUTPATIENT
Start: 2023-10-03 | End: 2023-10-08

## 2023-10-03 SDOH — ECONOMIC STABILITY: INCOME INSECURITY: HOW HARD IS IT FOR YOU TO PAY FOR THE VERY BASICS LIKE FOOD, HOUSING, MEDICAL CARE, AND HEATING?: NOT HARD AT ALL

## 2023-10-03 SDOH — ECONOMIC STABILITY: FOOD INSECURITY: WITHIN THE PAST 12 MONTHS, THE FOOD YOU BOUGHT JUST DIDN'T LAST AND YOU DIDN'T HAVE MONEY TO GET MORE.: NEVER TRUE

## 2023-10-03 SDOH — ECONOMIC STABILITY: FOOD INSECURITY: WITHIN THE PAST 12 MONTHS, YOU WORRIED THAT YOUR FOOD WOULD RUN OUT BEFORE YOU GOT MONEY TO BUY MORE.: NEVER TRUE

## 2023-10-03 SDOH — ECONOMIC STABILITY: HOUSING INSECURITY
IN THE LAST 12 MONTHS, WAS THERE A TIME WHEN YOU DID NOT HAVE A STEADY PLACE TO SLEEP OR SLEPT IN A SHELTER (INCLUDING NOW)?: NO

## 2023-10-03 ASSESSMENT — ENCOUNTER SYMPTOMS
SHORTNESS OF BREATH: 0
ABDOMINAL PAIN: 0
NAUSEA: 0
COUGH: 0

## 2023-10-03 ASSESSMENT — PATIENT HEALTH QUESTIONNAIRE - PHQ9
SUM OF ALL RESPONSES TO PHQ QUESTIONS 1-9: 0
SUM OF ALL RESPONSES TO PHQ9 QUESTIONS 1 & 2: 0
SUM OF ALL RESPONSES TO PHQ QUESTIONS 1-9: 0
1. LITTLE INTEREST OR PLEASURE IN DOING THINGS: 0
SUM OF ALL RESPONSES TO PHQ QUESTIONS 1-9: 0
SUM OF ALL RESPONSES TO PHQ QUESTIONS 1-9: 0
2. FEELING DOWN, DEPRESSED OR HOPELESS: 0

## 2023-10-03 NOTE — PROGRESS NOTES
Jimenez Diaz (1990) 29 y.o. male here for evaluation of the following chief complaint(s):      HPI:  Chief Complaint   Patient presents with    Leg Injury     Most recent Left leg injury 1mo ago and also had a left leg injury 1yr ago. Pt with bruising and swelling. Uncomfortable to be on knee. It is not hard to walk. 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 1 month ago had water over knee cap. That went down. Was crawling over joist and developed swelling below knee cap. Some stiffness. With bending his knee. Denies pain. Some lower leg swelling. Vitals:    10/03/23 0905   BP: 138/88   Pulse: 68   Resp: 16       Patient Active Problem List   Diagnosis    EMIL (serous otitis media)    Dysfunction of eustachian tube    Cholesteatoma, middle ear    Conductive hearing loss in right ear    Dizzy    Acute myopericarditis       SUBJECTIVE/OBJECTIVE:  Review of Systems   Constitutional:  Negative for chills and fever. HENT: Negative. Respiratory:  Negative for cough and shortness of breath. Cardiovascular:  Negative for chest pain. Gastrointestinal:  Negative for abdominal pain and nausea. Musculoskeletal:  Positive for arthralgias and joint swelling. Skin:  Negative for rash. Neurological:  Negative for dizziness, light-headedness and headaches. Psychiatric/Behavioral: Negative. Physical Exam  Constitutional:       General: He is not in acute distress. Eyes:      Pupils: Pupils are equal, round, and reactive to light. Cardiovascular:      Rate and Rhythm: Normal rate and regular rhythm. Heart sounds: No murmur heard. Pulmonary:      Effort: Pulmonary effort is normal.      Breath sounds: Normal breath sounds. No wheezing. Abdominal:      General: Bowel sounds are normal. There is no distension. Palpations: Abdomen is soft. Tenderness: There is no abdominal tenderness. Musculoskeletal:         General: No tenderness.  Normal

## 2023-10-23 ENCOUNTER — TELEPHONE (OUTPATIENT)
Dept: FAMILY MEDICINE CLINIC | Age: 33
End: 2023-10-23

## 2023-10-23 DIAGNOSIS — F41.9 ANXIETY: ICD-10-CM

## 2023-10-23 RX ORDER — ESCITALOPRAM OXALATE 20 MG/1
20 TABLET ORAL DAILY
Qty: 90 TABLET | Refills: 3 | Status: SHIPPED | OUTPATIENT
Start: 2023-10-23

## 2023-10-23 NOTE — TELEPHONE ENCOUNTER
----- Message from Michael Peterson sent at 10/23/2023 12:01 PM EDT -----  Subject: Refill Request    QUESTIONS  Name of Medication? escitalopram (LEXAPRO) 20 MG tablet  Patient-reported dosage and instructions? Take 1 tablet by mouth daily  How many days do you have left? 0  Preferred Pharmacy? [facility] #[id number]  Pharmacy phone number (if available)? [phone]  Additional Information for Provider? Spoke with patient, he is trying to   get a refill on this medication but says that the pharmacy told him that   he needed to speak to his provider 1st. He still has 3 refills left on   this prescription. Please return his call to assist, thank you  ---------------------------------------------------------------------------  --------------  CALL BACK INFO  What is the best way for the office to contact you? OK to leave message on   voicemail  Preferred Call Back Phone Number? [phone]  ---------------------------------------------------------------------------  --------------  SCRIPT ANSWERS  Relationship to Patient?  Self

## 2024-01-11 ENCOUNTER — TELEPHONE (OUTPATIENT)
Dept: FAMILY MEDICINE CLINIC | Age: 34
End: 2024-01-11

## 2024-01-11 NOTE — TELEPHONE ENCOUNTER
Patient called and stated that he was seen a few months back and had swelling on his knee. He stated that he was given a steroid and it did take the swelling away however the swelling has come back.     He stated that he has had the swelling for several weeks now in the knee but also in the right shin pain, swelling and bruising. He stated that a steroid is the only thing that helps however he is scheduled for surgery 1/26/24 and he can't take a steroid before surgery.   He also stated he can't take ibuprofen 8 days before surgery either.     He didn't know if there was something else to help with the fluid build up.

## 2024-01-11 NOTE — TELEPHONE ENCOUNTER
The knee could potentially be drained.  Per Jalen's note in October, pt had a prepatellar bursitis.  Otherwise, I'd leave it alone until after surgery.

## 2024-01-16 ASSESSMENT — PATIENT HEALTH QUESTIONNAIRE - PHQ9
SUM OF ALL RESPONSES TO PHQ QUESTIONS 1-9: 0
1. LITTLE INTEREST OR PLEASURE IN DOING THINGS: NOT AT ALL
SUM OF ALL RESPONSES TO PHQ QUESTIONS 1-9: 0
1. LITTLE INTEREST OR PLEASURE IN DOING THINGS: 0
SUM OF ALL RESPONSES TO PHQ QUESTIONS 1-9: 0
SUM OF ALL RESPONSES TO PHQ QUESTIONS 1-9: 0
SUM OF ALL RESPONSES TO PHQ9 QUESTIONS 1 & 2: 0
SUM OF ALL RESPONSES TO PHQ9 QUESTIONS 1 & 2: 0
2. FEELING DOWN, DEPRESSED OR HOPELESS: 0
2. FEELING DOWN, DEPRESSED OR HOPELESS: NOT AT ALL

## 2024-01-17 ENCOUNTER — OFFICE VISIT (OUTPATIENT)
Dept: FAMILY MEDICINE CLINIC | Age: 34
End: 2024-01-17

## 2024-01-17 VITALS
BODY MASS INDEX: 34.28 KG/M2 | RESPIRATION RATE: 18 BRPM | WEIGHT: 259.8 LBS | HEART RATE: 84 BPM | SYSTOLIC BLOOD PRESSURE: 134 MMHG | DIASTOLIC BLOOD PRESSURE: 76 MMHG

## 2024-01-17 DIAGNOSIS — R22.41 LOCALIZED SWELLING OF RIGHT LOWER LEG: ICD-10-CM

## 2024-01-17 DIAGNOSIS — M70.41 PREPATELLAR BURSITIS OF RIGHT KNEE: Primary | ICD-10-CM

## 2024-01-17 DIAGNOSIS — M70.52 INFRAPATELLAR BURSITIS OF LEFT KNEE: ICD-10-CM

## 2024-01-17 DIAGNOSIS — S80.11XA TRAUMATIC ECCHYMOSIS OF RIGHT LOWER LEG, INITIAL ENCOUNTER: ICD-10-CM

## 2024-01-17 RX ORDER — CEFADROXIL 500 MG/1
500 CAPSULE ORAL 2 TIMES DAILY
Qty: 20 CAPSULE | Refills: 0 | Status: SHIPPED | OUTPATIENT
Start: 2024-01-17 | End: 2024-01-27

## 2024-01-17 ASSESSMENT — ENCOUNTER SYMPTOMS
GASTROINTESTINAL NEGATIVE: 1
COLOR CHANGE: 1
RESPIRATORY NEGATIVE: 1

## 2024-01-17 NOTE — PROGRESS NOTES
Kirit Goss (:  1990) is a 33 y.o. male,Established patient, here for evaluation of the following chief complaint(s):  Swelling (Bilateral knees ) and brusing to right lower leg          Subjective   SUBJECTIVE/OBJECTIVE:  HPI  Chief Complaint   Patient presents with    Swelling     Bilateral knees     brusing to right lower leg     Pt presents today with bilateral knee pain for the several weeks.      Knees are swollen.      No pain in left knee but right knee is red, hot and tender.      Denies fevers.    Some bruising noted to right lower leg region.    Patient Active Problem List   Diagnosis    EMIL (serous otitis media)    Dysfunction of eustachian tube    Cholesteatoma, middle ear    Conductive hearing loss in right ear    Dizzy    Acute myopericarditis       Current Outpatient Medications   Medication Sig Dispense Refill    cefadroxil (DURICEF) 500 MG capsule Take 1 capsule by mouth 2 times daily for 10 days 20 capsule 0    escitalopram (LEXAPRO) 20 MG tablet Take 1 tablet by mouth daily 90 tablet 3    dicyclomine (BENTYL) 20 MG tablet Take 1 tablet by mouth 4 times daily for 5 days 20 tablet 0     No current facility-administered medications for this visit.       Past Surgical History:   Procedure Laterality Date    HAND SURGERY  2007    MASTOIDECTOMY Right 2013    SHOULDER SURGERY  2007    TYMPANOSTOMY TUBE PLACEMENT  2010    [facility]    VASECTOMY         Review of Systems   Constitutional: Negative.    HENT: Negative.     Respiratory: Negative.     Cardiovascular: Negative.    Gastrointestinal: Negative.    Musculoskeletal:  Positive for arthralgias and joint swelling (bl knees).   Skin:  Positive for color change.   All other systems reviewed and are negative.         Objective   Physical Exam  Vitals and nursing note reviewed.   Constitutional:       General: He is not in acute distress.     Appearance: Normal appearance. He is well-developed.   HENT:      Head: Normocephalic and

## 2024-01-21 LAB
BACTERIA SPEC AEROBE CULT: NORMAL
BACTERIA SPEC ANAEROBE CULT: NORMAL
GRAM STN SPEC: NORMAL

## 2024-04-11 ENCOUNTER — APPOINTMENT (OUTPATIENT)
Dept: GENERAL RADIOLOGY | Age: 34
End: 2024-04-11
Payer: COMMERCIAL

## 2024-04-11 ENCOUNTER — APPOINTMENT (OUTPATIENT)
Age: 34
End: 2024-04-11
Attending: STUDENT IN AN ORGANIZED HEALTH CARE EDUCATION/TRAINING PROGRAM
Payer: COMMERCIAL

## 2024-04-11 ENCOUNTER — HOSPITAL ENCOUNTER (EMERGENCY)
Age: 34
Discharge: HOME | End: 2024-04-11
Attending: EMERGENCY MEDICINE
Payer: COMMERCIAL

## 2024-04-11 VITALS
OXYGEN SATURATION: 96 % | DIASTOLIC BLOOD PRESSURE: 91 MMHG | WEIGHT: 240 LBS | TEMPERATURE: 98.4 F | SYSTOLIC BLOOD PRESSURE: 157 MMHG | HEIGHT: 73 IN | HEART RATE: 85 BPM | RESPIRATION RATE: 16 BRPM | BODY MASS INDEX: 31.81 KG/M2

## 2024-04-11 DIAGNOSIS — R53.81 MALAISE: ICD-10-CM

## 2024-04-11 DIAGNOSIS — R00.2 PALPITATIONS: Primary | ICD-10-CM

## 2024-04-11 DIAGNOSIS — R10.13 EPIGASTRIC PAIN: ICD-10-CM

## 2024-04-11 LAB
ALBUMIN SERPL BCG-MCNC: 4.9 G/DL (ref 3.5–5.1)
ALP SERPL-CCNC: 67 U/L (ref 38–126)
ALT SERPL W/O P-5'-P-CCNC: 22 U/L (ref 11–66)
ANION GAP SERPL CALC-SCNC: 15 MEQ/L (ref 8–16)
AST SERPL-CCNC: 21 U/L (ref 5–40)
BASOPHILS ABSOLUTE: 0.1 THOU/MM3 (ref 0–0.1)
BASOPHILS NFR BLD AUTO: 0.6 %
BILIRUB CONJ SERPL-MCNC: < 0.2 MG/DL (ref 0–0.3)
BILIRUB SERPL-MCNC: 0.5 MG/DL (ref 0.3–1.2)
BUN SERPL-MCNC: 12 MG/DL (ref 7–22)
CALCIUM SERPL-MCNC: 9.4 MG/DL (ref 8.5–10.5)
CHLORIDE SERPL-SCNC: 99 MEQ/L (ref 98–111)
CO2 SERPL-SCNC: 25 MEQ/L (ref 23–33)
CREAT SERPL-MCNC: 1 MG/DL (ref 0.4–1.2)
DEPRECATED RDW RBC AUTO: 39.2 FL (ref 35–45)
ECHO BSA: 2.37 M2
EKG ATRIAL RATE: 78 BPM
EKG P AXIS: 59 DEGREES
EKG P-R INTERVAL: 148 MS
EKG Q-T INTERVAL: 372 MS
EKG QRS DURATION: 96 MS
EKG QTC CALCULATION (BAZETT): 424 MS
EKG R AXIS: 6 DEGREES
EKG T AXIS: 54 DEGREES
EKG VENTRICULAR RATE: 78 BPM
EOSINOPHIL NFR BLD AUTO: 0.2 %
EOSINOPHILS ABSOLUTE: 0 THOU/MM3 (ref 0–0.4)
ERYTHROCYTE [DISTWIDTH] IN BLOOD BY AUTOMATED COUNT: 12.1 % (ref 11.5–14.5)
FLUAV RNA RESP QL NAA+PROBE: NOT DETECTED
FLUBV RNA RESP QL NAA+PROBE: NOT DETECTED
GFR SERPL CREATININE-BSD FRML MDRD: > 90 ML/MIN/1.73M2
GLUCOSE SERPL-MCNC: 99 MG/DL (ref 70–108)
HCT VFR BLD AUTO: 46.7 % (ref 42–52)
HGB BLD-MCNC: 16.2 GM/DL (ref 14–18)
IMM GRANULOCYTES # BLD AUTO: 0.02 THOU/MM3 (ref 0–0.07)
IMM GRANULOCYTES NFR BLD AUTO: 0.2 %
LIPASE SERPL-CCNC: 19.7 U/L (ref 5.6–51.3)
LYMPHOCYTES ABSOLUTE: 1.8 THOU/MM3 (ref 1–4.8)
LYMPHOCYTES NFR BLD AUTO: 18.6 %
MAGNESIUM SERPL-MCNC: 2.2 MG/DL (ref 1.6–2.4)
MCH RBC QN AUTO: 30.9 PG (ref 26–33)
MCHC RBC AUTO-ENTMCNC: 34.7 GM/DL (ref 32.2–35.5)
MCV RBC AUTO: 89 FL (ref 80–94)
MONOCYTES ABSOLUTE: 0.6 THOU/MM3 (ref 0.4–1.3)
MONOCYTES NFR BLD AUTO: 6.6 %
NEUTROPHILS NFR BLD AUTO: 73.8 %
NRBC BLD AUTO-RTO: 0 /100 WBC
OSMOLALITY SERPL CALC.SUM OF ELEC: 277.3 MOSMOL/KG (ref 275–300)
PLATELET # BLD AUTO: 352 THOU/MM3 (ref 130–400)
PMV BLD AUTO: 9.2 FL (ref 9.4–12.4)
POTASSIUM SERPL-SCNC: 3.9 MEQ/L (ref 3.5–5.2)
PROT SERPL-MCNC: 8 G/DL (ref 6.1–8)
RBC # BLD AUTO: 5.25 MILL/MM3 (ref 4.7–6.1)
SARS-COV-2 RNA RESP QL NAA+PROBE: NOT DETECTED
SEGMENTED NEUTROPHILS ABSOLUTE COUNT: 7.2 THOU/MM3 (ref 1.8–7.7)
SODIUM SERPL-SCNC: 139 MEQ/L (ref 135–145)
TROPONIN, HIGH SENSITIVITY: 6 NG/L (ref 0–12)
WBC # BLD AUTO: 9.8 THOU/MM3 (ref 4.8–10.8)

## 2024-04-11 PROCEDURE — 93005 ELECTROCARDIOGRAM TRACING: CPT | Performed by: EMERGENCY MEDICINE

## 2024-04-11 PROCEDURE — 99285 EMERGENCY DEPT VISIT HI MDM: CPT

## 2024-04-11 PROCEDURE — 93010 ELECTROCARDIOGRAM REPORT: CPT | Performed by: NUCLEAR MEDICINE

## 2024-04-11 PROCEDURE — 84484 ASSAY OF TROPONIN QUANT: CPT

## 2024-04-11 PROCEDURE — 80048 BASIC METABOLIC PNL TOTAL CA: CPT

## 2024-04-11 PROCEDURE — 96361 HYDRATE IV INFUSION ADD-ON: CPT

## 2024-04-11 PROCEDURE — 96360 HYDRATION IV INFUSION INIT: CPT

## 2024-04-11 PROCEDURE — 83735 ASSAY OF MAGNESIUM: CPT

## 2024-04-11 PROCEDURE — 83690 ASSAY OF LIPASE: CPT

## 2024-04-11 PROCEDURE — 85025 COMPLETE CBC W/AUTO DIFF WBC: CPT

## 2024-04-11 PROCEDURE — 2580000003 HC RX 258: Performed by: STUDENT IN AN ORGANIZED HEALTH CARE EDUCATION/TRAINING PROGRAM

## 2024-04-11 PROCEDURE — 87636 SARSCOV2 & INF A&B AMP PRB: CPT

## 2024-04-11 PROCEDURE — 71046 X-RAY EXAM CHEST 2 VIEWS: CPT

## 2024-04-11 PROCEDURE — 93270 REMOTE 30 DAY ECG REV/REPORT: CPT

## 2024-04-11 PROCEDURE — 80076 HEPATIC FUNCTION PANEL: CPT

## 2024-04-11 PROCEDURE — 36415 COLL VENOUS BLD VENIPUNCTURE: CPT

## 2024-04-11 PROCEDURE — 6370000000 HC RX 637 (ALT 250 FOR IP): Performed by: STUDENT IN AN ORGANIZED HEALTH CARE EDUCATION/TRAINING PROGRAM

## 2024-04-11 RX ORDER — SODIUM CHLORIDE, SODIUM LACTATE, POTASSIUM CHLORIDE, AND CALCIUM CHLORIDE .6; .31; .03; .02 G/100ML; G/100ML; G/100ML; G/100ML
500 INJECTION, SOLUTION INTRAVENOUS ONCE
Status: COMPLETED | OUTPATIENT
Start: 2024-04-11 | End: 2024-04-11

## 2024-04-11 RX ORDER — ACETAMINOPHEN 500 MG
1000 TABLET ORAL ONCE
Status: COMPLETED | OUTPATIENT
Start: 2024-04-11 | End: 2024-04-11

## 2024-04-11 RX ORDER — ONDANSETRON 2 MG/ML
4 INJECTION INTRAMUSCULAR; INTRAVENOUS ONCE
Status: DISCONTINUED | OUTPATIENT
Start: 2024-04-11 | End: 2024-04-11 | Stop reason: HOSPADM

## 2024-04-11 RX ORDER — PANTOPRAZOLE SODIUM 40 MG/1
40 TABLET, DELAYED RELEASE ORAL
Qty: 30 TABLET | Refills: 0 | Status: SHIPPED | OUTPATIENT
Start: 2024-04-11

## 2024-04-11 RX ADMIN — ACETAMINOPHEN 1000 MG: 500 TABLET ORAL at 17:58

## 2024-04-11 RX ADMIN — SODIUM CHLORIDE, POTASSIUM CHLORIDE, SODIUM LACTATE AND CALCIUM CHLORIDE 500 ML: 600; 310; 30; 20 INJECTION, SOLUTION INTRAVENOUS at 18:05

## 2024-04-11 RX ADMIN — SODIUM CHLORIDE, POTASSIUM CHLORIDE, SODIUM LACTATE AND CALCIUM CHLORIDE 500 ML: 600; 310; 30; 20 INJECTION, SOLUTION INTRAVENOUS at 18:17

## 2024-04-11 ASSESSMENT — PAIN SCALES - GENERAL: PAINLEVEL_OUTOF10: 6

## 2024-04-11 ASSESSMENT — PAIN DESCRIPTION - DESCRIPTORS: DESCRIPTORS: STABBING;BURNING

## 2024-04-11 ASSESSMENT — PAIN DESCRIPTION - ORIENTATION: ORIENTATION: MID

## 2024-04-11 ASSESSMENT — PAIN - FUNCTIONAL ASSESSMENT: PAIN_FUNCTIONAL_ASSESSMENT: 0-10

## 2024-04-11 ASSESSMENT — PAIN DESCRIPTION - LOCATION: LOCATION: CHEST

## 2024-04-11 NOTE — ED PROVIDER NOTES
[facility] EMERGENCY DEPARTMENT - VISIT NOTE    Patient Name: Kirit Goss  MRN: [id number]  YOB: 1990  Date of Evaluation: 4/11/2024  Treating Resident Physician: Román Marr MD  Supervising Physician: Yousif Mujica DO    CHIEF COMPLAINT       Chief Complaint   Patient presents with    Palpitations       HISTORY OF PRESENT ILLNESS    HPI    History obtained from chart review and the patient.    Kirit is a 33 y.o. old male who presents to the emergency department by Walk In for evaluation of palpitations, abdominal pain, fatigue.  Patient reports symptoms ongoing for 2 weeks initially started with some fatigue and viral-like illness.  He reports palpitations are worse when he is at work feels like his heart skipping a beat.  He reports his heart rate goes up and this is worse with exercise or lifting anything at work.  He reports prior evaluation by multiple cardiologist in the past including fairly extensive workup in 2021 with Dr. Jarvis.  He also reports some abdominal discomfort and nausea over the past few days.  No fever, no chills, no sick contacts.  Reports drinking about 1 caffeinated beverage daily.    Chart reviewed, notable for 48-hour Holter monitoring, cardiac stress test, echocardiogram in October 21 overall unremarkable.  Prior workup in 2018 for similar symptoms.      REVIEW OF SYSTEMS   Review of Systems  Negative unless documented in HPI    PAST MEDICAL AND SURGICAL HISTORY   Kirit  has a past medical history of Acute myopericarditis, Anxiety, Cholesteatoma, Headache(784.0), and Migraine.    Kirit  has a past surgical history that includes Hand surgery (2007); shoulder surgery (2007); Tympanostomy tube placement (2010); mastoidectomy (Right, 07/2013); and Vasectomy.    CURRENT MEDICATIONS   Kirit has a current medication list which includes the following long-term medication(s): pantoprazole, escitalopram, and dicyclomine.    ALLERGIES   Kirit has No Known Allergies.    FAMILY

## 2024-04-11 NOTE — DISCHARGE INSTRUCTIONS
He was seen today for palpitations, fatigue, abdominal pain.  Your lab work is reassuring.  You have an event monitor to wear for the next 14 days please follow instructions from cardiology and other return.    Start taking pantoprazole daily.    Please follow-up with your primary care physician as well as cardiology.    If symptoms are worse or other new concerns please come back to the Emergency Department for re-evaluation.

## 2024-04-12 ENCOUNTER — TELEPHONE (OUTPATIENT)
Dept: FAMILY MEDICINE CLINIC | Age: 34
End: 2024-04-12

## 2024-04-12 NOTE — DISCHARGE INSTR - COC
Pulse 85   Temp 98.4 °F (36.9 °C) (Oral)   Resp 16   Ht 1.854 m (6' 1\")   Wt 108.9 kg (240 lb)   SpO2 96%   BMI 31.66 kg/m²     Last documented pain score (0-10 scale): Pain Level: 6  Last Weight:   Wt Readings from Last 1 Encounters:   24 108.9 kg (240 lb)     Mental Status:  {IP PT MENTAL STATUS:}    IV Access:  { RENÉ IV ACCESS:035764125}    Nursing Mobility/ADLs:  Walking   {CHP DME ADLs:360475834}  Transfer  {CHP DME ADLs:091744677}  Bathing  {CHP DME ADLs:702034410}  Dressing  {CHP DME ADLs:885078304}  Toileting  {CHP DME ADLs:631472538}  Feeding  {CHP DME ADLs:804996098}  Med Admin  {P DME ADLs:265442281}  Med Delivery   {[facility] MED Delivery:614725142}    Wound Care Documentation and Therapy:        Elimination:  Continence:   Bowel: {YES / NO:}  Bladder: {YES / NO:}  Urinary Catheter: {Urinary Catheter:548833017}   Colostomy/Ileostomy/Ileal Conduit: {YES / NO:}       Date of Last BM: ***  No intake or output data in the 24 hours ending 24  No intake/output data recorded.    Safety Concerns:     { RENÉ Safety Concerns:116057246}    Impairments/Disabilities:      {[facility] Impairments/Disabilities:325001897}    Nutrition Therapy:  Current Nutrition Therapy:   { RENÉ Diet List:086984607}    Routes of Feeding: {CHP DME Other Feedings:643899281}  Liquids: {Slp liquid thickness:10573}  Daily Fluid Restriction: {CHP DME Yes amt example:102661976}  Last Modified Barium Swallow with Video (Video Swallowing Test): {Done Not Done Date:}    Treatments at the Time of Hospital Discharge:   Respiratory Treatments: ***  Oxygen Therapy:  {Therapy; copd oxygen:64014}  Ventilator:    {[facility] Vent List:983396513}    Rehab Therapies: {THERAPEUTIC INTERVENTION:7829584520}  Weight Bearing Status/Restrictions: {[facility] Weight Bearin}  Other Medical Equipment (for information only, NOT a DME order):  {EQUIPMENT:438644106}  Other Treatments: ***    Patient's personal

## 2024-04-16 ENCOUNTER — OFFICE VISIT (OUTPATIENT)
Dept: FAMILY MEDICINE CLINIC | Age: 34
End: 2024-04-16
Payer: COMMERCIAL

## 2024-04-16 VITALS
DIASTOLIC BLOOD PRESSURE: 76 MMHG | RESPIRATION RATE: 16 BRPM | HEART RATE: 80 BPM | BODY MASS INDEX: 31.9 KG/M2 | WEIGHT: 241.8 LBS | SYSTOLIC BLOOD PRESSURE: 128 MMHG

## 2024-04-16 DIAGNOSIS — R00.2 HEART PALPITATIONS: Primary | ICD-10-CM

## 2024-04-16 DIAGNOSIS — K58.9 IRRITABLE BOWEL SYNDROME, UNSPECIFIED TYPE: ICD-10-CM

## 2024-04-16 DIAGNOSIS — F41.0 PANIC ATTACKS: ICD-10-CM

## 2024-04-16 DIAGNOSIS — F41.9 ANXIETY: ICD-10-CM

## 2024-04-16 PROCEDURE — 99214 OFFICE O/P EST MOD 30 MIN: CPT | Performed by: FAMILY MEDICINE

## 2024-04-16 PROCEDURE — G2211 COMPLEX E/M VISIT ADD ON: HCPCS | Performed by: FAMILY MEDICINE

## 2024-04-16 RX ORDER — LORATADINE 10 MG/1
10 TABLET ORAL DAILY
COMMUNITY

## 2024-04-16 RX ORDER — BUSPIRONE HYDROCHLORIDE 7.5 MG/1
7.5 TABLET ORAL 2 TIMES DAILY
Qty: 60 TABLET | Refills: 0 | Status: SHIPPED | OUTPATIENT
Start: 2024-04-16 | End: 2024-05-16

## 2024-04-16 ASSESSMENT — ENCOUNTER SYMPTOMS
RESPIRATORY NEGATIVE: 1
ABDOMINAL PAIN: 1

## 2024-04-16 NOTE — PROGRESS NOTES
Kirit Goss (:  1990) is a 33 y.o. male,Established patient, here for evaluation of the following chief complaint(s):  ED Follow-up (In [facility] ED on 24 - palpations) and Anxiety          Subjective   SUBJECTIVE/OBJECTIVE:  HPI  Chief Complaint   Patient presents with    ED Follow-up     In [facility] ED on 24 - palpations    Anxiety     ED follow up.    CHIEF COMPLAINT            Chief Complaint   Patient presents with    Palpitations         HISTORY OF PRESENT ILLNESS    HPI     History obtained from chart review and the patient.     Kirit is a 33 y.o. old male who presents to the emergency department by Walk In for evaluation of palpitations, abdominal pain, fatigue.  Patient reports symptoms ongoing for 2 weeks initially started with some fatigue and viral-like illness.  He reports palpitations are worse when he is at work feels like his heart skipping a beat.  He reports his heart rate goes up and this is worse with exercise or lifting anything at work.  He reports prior evaluation by multiple cardiologist in the past including fairly extensive workup in  with Dr. Jarvis.  He also reports some abdominal discomfort and nausea over the past few days.  No fever, no chills, no sick contacts.  Reports drinking about 1 caffeinated beverage daily.     Chart reviewed, notable for 48-hour Holter monitoring, cardiac stress test, echocardiogram in  overall unremarkable.  Prior workup in 2018 for similar symptoms.    Believes that this is likely anxiety related and does not   BP Readings from Last 3 Encounters:   24 128/76   24 (!) 157/91   24 134/76     Wt Readings from Last 3 Encounters:   24 109.7 kg (241 lb 12.8 oz)   24 108.9 kg (240 lb)   24 117.8 kg (259 lb 12.8 oz)     Vitals:    24 0744   BP: 128/76   Pulse: 80   Resp: 16       Patient Active Problem List   Diagnosis    EMIL (serous otitis media)    Dysfunction of eustachian tube

## 2024-04-17 LAB
EKG ATRIAL RATE: 78 BPM
EKG P AXIS: 59 DEGREES
EKG P-R INTERVAL: 148 MS
EKG Q-T INTERVAL: 372 MS
EKG QRS DURATION: 96 MS
EKG QTC CALCULATION (BAZETT): 424 MS
EKG R AXIS: 6 DEGREES
EKG T AXIS: 54 DEGREES
EKG VENTRICULAR RATE: 78 BPM

## 2024-04-27 LAB — ECHO BSA: 2.37 M2

## 2024-04-29 LAB — ECHO BSA: 2.37 M2

## 2024-05-08 DIAGNOSIS — F41.9 ANXIETY: ICD-10-CM

## 2024-05-08 RX ORDER — BUSPIRONE HYDROCHLORIDE 7.5 MG/1
7.5 TABLET ORAL 2 TIMES DAILY
Qty: 180 TABLET | Refills: 1 | OUTPATIENT
Start: 2024-05-08

## 2024-05-14 ENCOUNTER — OFFICE VISIT (OUTPATIENT)
Dept: FAMILY MEDICINE CLINIC | Age: 34
End: 2024-05-14
Payer: COMMERCIAL

## 2024-05-14 VITALS
HEART RATE: 76 BPM | BODY MASS INDEX: 32.57 KG/M2 | WEIGHT: 246.9 LBS | DIASTOLIC BLOOD PRESSURE: 74 MMHG | SYSTOLIC BLOOD PRESSURE: 128 MMHG | RESPIRATION RATE: 16 BRPM

## 2024-05-14 DIAGNOSIS — F41.9 ANXIETY: ICD-10-CM

## 2024-05-14 DIAGNOSIS — R00.2 HEART PALPITATIONS: Primary | ICD-10-CM

## 2024-05-14 DIAGNOSIS — F41.0 PANIC ATTACKS: ICD-10-CM

## 2024-05-14 PROCEDURE — 99213 OFFICE O/P EST LOW 20 MIN: CPT | Performed by: FAMILY MEDICINE

## 2024-05-14 PROCEDURE — G2211 COMPLEX E/M VISIT ADD ON: HCPCS | Performed by: FAMILY MEDICINE

## 2024-05-14 RX ORDER — BUSPIRONE HYDROCHLORIDE 7.5 MG/1
7.5 TABLET ORAL 2 TIMES DAILY
Qty: 180 TABLET | Refills: 3 | Status: SHIPPED | OUTPATIENT
Start: 2024-05-14

## 2024-05-14 NOTE — PROGRESS NOTES
Kirit Goss (:  1990) is a 33 y.o. male,Established patient, here for evaluation of the following chief complaint(s):  1 Month Follow-Up and Anxiety          Subjective   SUBJECTIVE/OBJECTIVE:  HPI  Chief Complaint   Patient presents with    1 Month Follow-Up    Anxiety     1 month eval.    Feeling much better on the higher dose Lexapro and with the addition of Buspar.      Tolerating well.    Very few palpitations.  No major panic attacks.      Patient Active Problem List   Diagnosis    EMIL (serous otitis media)    Dysfunction of eustachian tube    Cholesteatoma, middle ear    Conductive hearing loss in right ear    Dizzy    Acute myopericarditis       Current Outpatient Medications   Medication Sig Dispense Refill    busPIRone (BUSPAR) 7.5 MG tablet Take 1 tablet by mouth 2 times daily 180 tablet 3    loratadine (CLARITIN) 10 MG tablet Take 1 tablet by mouth daily      escitalopram (LEXAPRO) 20 MG tablet Take 1 tablet by mouth daily 90 tablet 3     No current facility-administered medications for this visit.       Past Surgical History:   Procedure Laterality Date    HAND SURGERY  2007    MASTOIDECTOMY Right 2013    SHOULDER SURGERY  2007    TYMPANOSTOMY TUBE PLACEMENT  2010    [facility]    VASECTOMY         Review of Systems   Constitutional: Negative.    HENT: Negative.     Respiratory: Negative.     Cardiovascular: Negative.    Gastrointestinal: Negative.    Musculoskeletal: Negative.    All other systems reviewed and are negative.         Objective   Physical Exam  Vitals and nursing note reviewed.   Constitutional:       General: He is not in acute distress.     Appearance: Normal appearance. He is well-developed.   HENT:      Head: Normocephalic and atraumatic.      Right Ear: Tympanic membrane normal.      Left Ear: Tympanic membrane normal.   Eyes:      Conjunctiva/sclera: Conjunctivae normal.   Cardiovascular:      Rate and Rhythm: Normal rate and regular rhythm.      Heart sounds:

## 2024-05-15 ASSESSMENT — ENCOUNTER SYMPTOMS
RESPIRATORY NEGATIVE: 1
GASTROINTESTINAL NEGATIVE: 1

## 2024-06-16 ENCOUNTER — HOSPITAL ENCOUNTER (EMERGENCY)
Age: 34
Discharge: HOME | End: 2024-06-16
Attending: STUDENT IN AN ORGANIZED HEALTH CARE EDUCATION/TRAINING PROGRAM
Payer: COMMERCIAL

## 2024-06-16 VITALS
HEIGHT: 73 IN | SYSTOLIC BLOOD PRESSURE: 139 MMHG | DIASTOLIC BLOOD PRESSURE: 87 MMHG | WEIGHT: 240 LBS | RESPIRATION RATE: 16 BRPM | OXYGEN SATURATION: 99 % | HEART RATE: 67 BPM | TEMPERATURE: 98.1 F | BODY MASS INDEX: 31.81 KG/M2

## 2024-06-16 DIAGNOSIS — T15.91XA FOREIGN BODY OF RIGHT EXTERNAL EYE, INITIAL ENCOUNTER: Primary | ICD-10-CM

## 2024-06-16 PROCEDURE — 99284 EMERGENCY DEPT VISIT MOD MDM: CPT

## 2024-06-16 PROCEDURE — 65222 REMOVE FOREIGN BODY FROM EYE: CPT

## 2024-06-16 PROCEDURE — 6360000002 HC RX W HCPCS

## 2024-06-16 PROCEDURE — 90715 TDAP VACCINE 7 YRS/> IM: CPT

## 2024-06-16 PROCEDURE — 90471 IMMUNIZATION ADMIN: CPT

## 2024-06-16 RX ORDER — PROPARACAINE HYDROCHLORIDE 5 MG/ML
1 SOLUTION/ DROPS OPHTHALMIC ONCE
Status: DISCONTINUED | OUTPATIENT
Start: 2024-06-16 | End: 2024-06-16 | Stop reason: HOSPADM

## 2024-06-16 RX ORDER — OFLOXACIN 3 MG/ML
3 SOLUTION/ DROPS OPHTHALMIC 4 TIMES DAILY
Qty: 5 ML | Refills: 0 | Status: SHIPPED | OUTPATIENT
Start: 2024-06-16 | End: 2024-06-26

## 2024-06-16 RX ADMIN — TETANUS TOXOID, REDUCED DIPHTHERIA TOXOID AND ACELLULAR PERTUSSIS VACCINE, ADSORBED 0.5 ML: 5; 2.5; 8; 8; 2.5 SUSPENSION INTRAMUSCULAR at 18:34

## 2024-06-16 ASSESSMENT — PAIN DESCRIPTION - PAIN TYPE: TYPE: ACUTE PAIN

## 2024-06-16 ASSESSMENT — PAIN - FUNCTIONAL ASSESSMENT: PAIN_FUNCTIONAL_ASSESSMENT: 0-10

## 2024-06-16 ASSESSMENT — PAIN DESCRIPTION - FREQUENCY: FREQUENCY: CONTINUOUS

## 2024-06-16 ASSESSMENT — PAIN DESCRIPTION - ORIENTATION: ORIENTATION: RIGHT

## 2024-06-16 ASSESSMENT — PAIN DESCRIPTION - LOCATION: LOCATION: EYE

## 2024-06-16 ASSESSMENT — PAIN SCALES - GENERAL: PAINLEVEL_OUTOF10: 1

## 2024-06-16 NOTE — ED NOTES
Patient presents with c/o foreign body in right eye. Pt states he was working on his car, cutting a bolt, and believes piece of metal flew into eye.

## 2024-06-16 NOTE — DISCHARGE INSTRUCTIONS
Return to the emergency department immediately for any change or worsening of symptoms including not limited to change in vision, worsening eye pain, fever, chills, eye discharge.  Please follow-up with [facility].

## 2024-06-16 NOTE — ED PROVIDER NOTES
[facility] EMERGENCY DEPT  EMERGENCY DEPARTMENT ENCOUNTER          Pt Name: Kirit Goss  MRN: [id number]  Birthdate 1990  Date of evaluation: 6/16/2024  Physician: Michael Randolph MD  Supervising Attending Physician: No att. providers found       CHIEF COMPLAINT       Chief Complaint   Patient presents with    Foreign Body in Eye         HISTORY OF PRESENT ILLNESS    HPI  Kirit Goss is a 33 y.o. male who presents to the emergency department from home, as a walk in to the ED lobby for evaluation of piece of metal in his eye.  Patient reports that he was working under his truck cutting a bolt off with a saws all under a metal flake fell on his right eye.  Patient reports pain in the right eye.  He denies any change in vision.   The patient has no other acute complaints at this time.          PAST MEDICAL AND SURGICAL HISTORY     Past Medical History:   Diagnosis Date    Acute myopericarditis     Anxiety     Cholesteatoma     Headache(784.0)     Migraine      Past Surgical History:   Procedure Laterality Date    HAND SURGERY  2007    MASTOIDECTOMY Right 07/2013    SHOULDER SURGERY  2007    TYMPANOSTOMY TUBE PLACEMENT  2010    [facility]    VASECTOMY           MEDICATIONS   No current facility-administered medications for this encounter.    Current Outpatient Medications:     ofloxacin (OCUFLOX) 0.3 % solution, Place 3 drops into the right eye 4 times daily for 10 days, Disp: 5 mL, Rfl: 0    busPIRone (BUSPAR) 7.5 MG tablet, Take 1 tablet by mouth 2 times daily, Disp: 180 tablet, Rfl: 3    loratadine (CLARITIN) 10 MG tablet, Take 1 tablet by mouth daily, Disp: , Rfl:     escitalopram (LEXAPRO) 20 MG tablet, Take 1 tablet by mouth daily, Disp: 90 tablet, Rfl: 3    Discharge Medication List as of 6/16/2024  6:48 PM        CONTINUE these medications which have NOT CHANGED    Details   busPIRone (BUSPAR) 7.5 MG tablet Take 1 tablet by mouth 2 times daily, Disp-180 tablet, R-3Normal

## 2024-06-17 ENCOUNTER — TELEPHONE (OUTPATIENT)
Dept: FAMILY MEDICINE CLINIC | Age: 34
End: 2024-06-17

## 2024-12-23 DIAGNOSIS — F41.9 ANXIETY: ICD-10-CM

## 2024-12-23 RX ORDER — ESCITALOPRAM OXALATE 20 MG/1
20 TABLET ORAL DAILY
Qty: 90 TABLET | Refills: 1 | Status: SHIPPED | OUTPATIENT
Start: 2024-12-23

## 2025-06-16 NOTE — TELEPHONE ENCOUNTER
Called patient and appointment scheduled    Medication passed protocol.     Medication: Atorvastatin passed protocol.   Last office visit date: 09/12/2024  Next appointment scheduled?: Yes